# Patient Record
Sex: FEMALE | Race: OTHER | NOT HISPANIC OR LATINO
[De-identification: names, ages, dates, MRNs, and addresses within clinical notes are randomized per-mention and may not be internally consistent; named-entity substitution may affect disease eponyms.]

---

## 2018-09-13 PROBLEM — Z00.00 ENCOUNTER FOR PREVENTIVE HEALTH EXAMINATION: Status: ACTIVE | Noted: 2018-09-13

## 2018-09-18 ENCOUNTER — APPOINTMENT (OUTPATIENT)
Dept: OBGYN | Facility: CLINIC | Age: 33
End: 2018-09-18
Payer: OTHER GOVERNMENT

## 2018-09-18 ENCOUNTER — ASOB RESULT (OUTPATIENT)
Age: 33
End: 2018-09-18

## 2018-09-18 VITALS
SYSTOLIC BLOOD PRESSURE: 133 MMHG | HEART RATE: 79 BPM | BODY MASS INDEX: 23.06 KG/M2 | HEIGHT: 64 IN | DIASTOLIC BLOOD PRESSURE: 82 MMHG | WEIGHT: 135.1 LBS

## 2018-09-18 DIAGNOSIS — Z32.01 ENCOUNTER FOR PREGNANCY TEST, RESULT POSITIVE: ICD-10-CM

## 2018-09-18 PROCEDURE — 76805 OB US >/= 14 WKS SNGL FETUS: CPT

## 2018-09-18 PROCEDURE — 99204 OFFICE O/P NEW MOD 45 MIN: CPT

## 2018-09-20 ENCOUNTER — APPOINTMENT (OUTPATIENT)
Dept: ANTEPARTUM | Facility: CLINIC | Age: 33
End: 2018-09-20
Payer: OTHER GOVERNMENT

## 2018-09-20 ENCOUNTER — ASOB RESULT (OUTPATIENT)
Age: 33
End: 2018-09-20

## 2018-09-20 PROCEDURE — 76813 OB US NUCHAL MEAS 1 GEST: CPT

## 2018-09-20 PROCEDURE — 76801 OB US < 14 WKS SINGLE FETUS: CPT

## 2018-09-20 PROCEDURE — 36416 COLLJ CAPILLARY BLOOD SPEC: CPT

## 2018-10-18 ENCOUNTER — APPOINTMENT (OUTPATIENT)
Dept: OBGYN | Facility: CLINIC | Age: 33
End: 2018-10-18
Payer: OTHER GOVERNMENT

## 2018-10-18 ENCOUNTER — NON-APPOINTMENT (OUTPATIENT)
Age: 33
End: 2018-10-18

## 2018-10-18 ENCOUNTER — LABORATORY RESULT (OUTPATIENT)
Age: 33
End: 2018-10-18

## 2018-10-18 VITALS
WEIGHT: 137 LBS | HEIGHT: 64 IN | BODY MASS INDEX: 23.39 KG/M2 | DIASTOLIC BLOOD PRESSURE: 84 MMHG | SYSTOLIC BLOOD PRESSURE: 124 MMHG

## 2018-10-18 DIAGNOSIS — Z34.02 ENCOUNTER FOR SUPERVISION OF NORMAL FIRST PREGNANCY, SECOND TRIMESTER: ICD-10-CM

## 2018-10-18 PROCEDURE — 0501F PRENATAL FLOW SHEET: CPT

## 2018-11-08 ENCOUNTER — APPOINTMENT (OUTPATIENT)
Dept: ANTEPARTUM | Facility: CLINIC | Age: 33
End: 2018-11-08
Payer: OTHER GOVERNMENT

## 2018-11-08 ENCOUNTER — ASOB RESULT (OUTPATIENT)
Age: 33
End: 2018-11-08

## 2018-11-08 PROCEDURE — 76811 OB US DETAILED SNGL FETUS: CPT

## 2018-11-15 ENCOUNTER — APPOINTMENT (OUTPATIENT)
Dept: OBGYN | Facility: CLINIC | Age: 33
End: 2018-11-15
Payer: OTHER GOVERNMENT

## 2018-11-15 VITALS
HEIGHT: 64 IN | SYSTOLIC BLOOD PRESSURE: 121 MMHG | WEIGHT: 145.06 LBS | DIASTOLIC BLOOD PRESSURE: 76 MMHG | BODY MASS INDEX: 24.77 KG/M2

## 2018-11-15 PROCEDURE — 90471 IMMUNIZATION ADMIN: CPT

## 2018-11-15 PROCEDURE — 90688 IIV4 VACCINE SPLT 0.5 ML IM: CPT

## 2018-11-15 PROCEDURE — 0502F SUBSEQUENT PRENATAL CARE: CPT

## 2018-11-29 ENCOUNTER — OTHER (OUTPATIENT)
Age: 33
End: 2018-11-29

## 2018-12-13 ENCOUNTER — NON-APPOINTMENT (OUTPATIENT)
Age: 33
End: 2018-12-13

## 2018-12-13 ENCOUNTER — APPOINTMENT (OUTPATIENT)
Dept: OBGYN | Facility: CLINIC | Age: 33
End: 2018-12-13
Payer: OTHER GOVERNMENT

## 2018-12-13 VITALS
WEIGHT: 146 LBS | HEIGHT: 64 IN | BODY MASS INDEX: 24.92 KG/M2 | SYSTOLIC BLOOD PRESSURE: 107 MMHG | DIASTOLIC BLOOD PRESSURE: 69 MMHG

## 2018-12-13 PROCEDURE — 0502F SUBSEQUENT PRENATAL CARE: CPT

## 2019-01-03 ENCOUNTER — APPOINTMENT (OUTPATIENT)
Dept: OBGYN | Facility: CLINIC | Age: 34
End: 2019-01-03
Payer: OTHER GOVERNMENT

## 2019-01-03 VITALS
SYSTOLIC BLOOD PRESSURE: 116 MMHG | HEIGHT: 64 IN | BODY MASS INDEX: 26.14 KG/M2 | DIASTOLIC BLOOD PRESSURE: 74 MMHG | WEIGHT: 153.13 LBS

## 2019-01-03 PROCEDURE — 0502F SUBSEQUENT PRENATAL CARE: CPT

## 2019-01-03 PROCEDURE — 90471 IMMUNIZATION ADMIN: CPT

## 2019-01-03 PROCEDURE — 90715 TDAP VACCINE 7 YRS/> IM: CPT

## 2019-01-04 LAB
ABO + RH PNL BLD: NORMAL
AR GENE MUT ANL BLD/T: NEGATIVE
BACTERIA UR CULT: NORMAL
BASOPHILS # BLD AUTO: 0.03 K/UL
BASOPHILS NFR BLD AUTO: 0.2 %
BLD GP AB SCN SERPL QL: NORMAL
C TRACH RRNA SPEC QL NAA+PROBE: NOT DETECTED
CFTR MUT TESTED BLD/T: NEGATIVE
EOSINOPHIL # BLD AUTO: 0.16 K/UL
EOSINOPHIL NFR BLD AUTO: 1.2 %
FMR1 GENE MUT ANL BLD/T: NORMAL
GLUCOSE 1H P 50 G GLC PO SERPL-MCNC: 131 MG/DL
HBV SURFACE AG SER QL: NONREACTIVE
HCT VFR BLD CALC: 35.4 %
HGB A MFR BLD: 97.3 %
HGB A2 MFR BLD: 2.7 %
HGB BLD-MCNC: 11.4 G/DL
HGB FRACT BLD-IMP: NORMAL
HIV1+2 AB SPEC QL IA.RAPID: NONREACTIVE
IMM GRANULOCYTES NFR BLD AUTO: 1.7 %
LEAD BLD-MCNC: <1 UG/DL
LYMPHOCYTES # BLD AUTO: 2.25 K/UL
LYMPHOCYTES NFR BLD AUTO: 16.4 %
MAN DIFF?: NORMAL
MCHC RBC-ENTMCNC: 29.9 PG
MCHC RBC-ENTMCNC: 32.2 GM/DL
MCV RBC AUTO: 92.9 FL
MONOCYTES # BLD AUTO: 0.87 K/UL
MONOCYTES NFR BLD AUTO: 6.3 %
N GONORRHOEA RRNA SPEC QL NAA+PROBE: NOT DETECTED
NEUTROPHILS # BLD AUTO: 10.22 K/UL
NEUTROPHILS NFR BLD AUTO: 74.2 %
PLATELET # BLD AUTO: 217 K/UL
RBC # BLD: 3.81 M/UL
RBC # FLD: 13.9 %
RUBV IGG FLD-ACNC: 5.4 INDEX
RUBV IGG SER-IMP: POSITIVE
SOURCE AMPLIFICATION: NORMAL
T PALLIDUM AB SER QL IA: NEGATIVE
T4 FREE SERPL-MCNC: 1.2 NG/DL
TSH SERPL-ACNC: 1.4 UIU/ML
VZV AB TITR SER: POSITIVE
VZV IGG SER IF-ACNC: 803.4 INDEX
WBC # FLD AUTO: 13.76 K/UL

## 2019-01-17 ENCOUNTER — ASOB RESULT (OUTPATIENT)
Age: 34
End: 2019-01-17

## 2019-01-17 ENCOUNTER — APPOINTMENT (OUTPATIENT)
Dept: ANTEPARTUM | Facility: CLINIC | Age: 34
End: 2019-01-17
Payer: OTHER GOVERNMENT

## 2019-01-17 PROCEDURE — 76805 OB US >/= 14 WKS SNGL FETUS: CPT

## 2019-01-17 PROCEDURE — 76819 FETAL BIOPHYS PROFIL W/O NST: CPT

## 2019-01-23 ENCOUNTER — NON-APPOINTMENT (OUTPATIENT)
Age: 34
End: 2019-01-23

## 2019-01-23 ENCOUNTER — APPOINTMENT (OUTPATIENT)
Dept: OBGYN | Facility: CLINIC | Age: 34
End: 2019-01-23
Payer: OTHER GOVERNMENT

## 2019-01-23 VITALS
DIASTOLIC BLOOD PRESSURE: 77 MMHG | SYSTOLIC BLOOD PRESSURE: 117 MMHG | HEIGHT: 64 IN | BODY MASS INDEX: 26.32 KG/M2 | WEIGHT: 154.19 LBS

## 2019-01-23 PROCEDURE — 0502F SUBSEQUENT PRENATAL CARE: CPT

## 2019-01-31 ENCOUNTER — APPOINTMENT (OUTPATIENT)
Dept: ANTEPARTUM | Facility: HOSPITAL | Age: 34
End: 2019-01-31

## 2019-01-31 ENCOUNTER — ASOB RESULT (OUTPATIENT)
Age: 34
End: 2019-01-31

## 2019-01-31 ENCOUNTER — APPOINTMENT (OUTPATIENT)
Dept: ANTEPARTUM | Facility: CLINIC | Age: 34
End: 2019-01-31
Payer: OTHER GOVERNMENT

## 2019-01-31 PROCEDURE — 76818 FETAL BIOPHYS PROFILE W/NST: CPT | Mod: 26

## 2019-01-31 PROCEDURE — 76820 UMBILICAL ARTERY ECHO: CPT

## 2019-01-31 PROCEDURE — 76816 OB US FOLLOW-UP PER FETUS: CPT

## 2019-02-06 ENCOUNTER — APPOINTMENT (OUTPATIENT)
Dept: ANTEPARTUM | Facility: CLINIC | Age: 34
End: 2019-02-06
Payer: OTHER GOVERNMENT

## 2019-02-06 ENCOUNTER — OUTPATIENT (OUTPATIENT)
Dept: OUTPATIENT SERVICES | Facility: HOSPITAL | Age: 34
LOS: 1 days | End: 2019-02-06

## 2019-02-06 ENCOUNTER — APPOINTMENT (OUTPATIENT)
Dept: ANTEPARTUM | Facility: HOSPITAL | Age: 34
End: 2019-02-06

## 2019-02-06 ENCOUNTER — ASOB RESULT (OUTPATIENT)
Age: 34
End: 2019-02-06

## 2019-02-06 PROCEDURE — 76820 UMBILICAL ARTERY ECHO: CPT

## 2019-02-06 PROCEDURE — 76818 FETAL BIOPHYS PROFILE W/NST: CPT | Mod: 26

## 2019-02-07 ENCOUNTER — NON-APPOINTMENT (OUTPATIENT)
Age: 34
End: 2019-02-07

## 2019-02-07 ENCOUNTER — APPOINTMENT (OUTPATIENT)
Dept: OBGYN | Facility: CLINIC | Age: 34
End: 2019-02-07
Payer: OTHER GOVERNMENT

## 2019-02-07 VITALS
HEIGHT: 64 IN | WEIGHT: 160 LBS | BODY MASS INDEX: 27.31 KG/M2 | DIASTOLIC BLOOD PRESSURE: 82 MMHG | SYSTOLIC BLOOD PRESSURE: 118 MMHG

## 2019-02-07 PROCEDURE — 0502F SUBSEQUENT PRENATAL CARE: CPT

## 2019-02-07 RX ORDER — PRENATAL VIT 49/IRON FUM/FOLIC 6.75-0.2MG
TABLET ORAL
Refills: 0 | Status: ACTIVE | COMMUNITY

## 2019-02-14 ENCOUNTER — APPOINTMENT (OUTPATIENT)
Dept: ANTEPARTUM | Facility: HOSPITAL | Age: 34
End: 2019-02-14

## 2019-02-14 ENCOUNTER — ASOB RESULT (OUTPATIENT)
Age: 34
End: 2019-02-14

## 2019-02-14 ENCOUNTER — OUTPATIENT (OUTPATIENT)
Dept: OUTPATIENT SERVICES | Facility: HOSPITAL | Age: 34
LOS: 1 days | End: 2019-02-14

## 2019-02-14 ENCOUNTER — APPOINTMENT (OUTPATIENT)
Dept: ANTEPARTUM | Facility: CLINIC | Age: 34
End: 2019-02-14
Payer: OTHER GOVERNMENT

## 2019-02-14 PROCEDURE — 76820 UMBILICAL ARTERY ECHO: CPT

## 2019-02-14 PROCEDURE — 76818 FETAL BIOPHYS PROFILE W/NST: CPT | Mod: 26

## 2019-02-14 PROCEDURE — 76816 OB US FOLLOW-UP PER FETUS: CPT

## 2019-02-20 ENCOUNTER — ASOB RESULT (OUTPATIENT)
Age: 34
End: 2019-02-20

## 2019-02-20 ENCOUNTER — NON-APPOINTMENT (OUTPATIENT)
Age: 34
End: 2019-02-20

## 2019-02-20 ENCOUNTER — APPOINTMENT (OUTPATIENT)
Dept: OBGYN | Facility: CLINIC | Age: 34
End: 2019-02-20
Payer: OTHER GOVERNMENT

## 2019-02-20 ENCOUNTER — OUTPATIENT (OUTPATIENT)
Dept: OUTPATIENT SERVICES | Facility: HOSPITAL | Age: 34
LOS: 1 days | End: 2019-02-20

## 2019-02-20 ENCOUNTER — APPOINTMENT (OUTPATIENT)
Dept: ANTEPARTUM | Facility: HOSPITAL | Age: 34
End: 2019-02-20

## 2019-02-20 ENCOUNTER — APPOINTMENT (OUTPATIENT)
Dept: ANTEPARTUM | Facility: CLINIC | Age: 34
End: 2019-02-20
Payer: OTHER GOVERNMENT

## 2019-02-20 VITALS
SYSTOLIC BLOOD PRESSURE: 132 MMHG | BODY MASS INDEX: 28.17 KG/M2 | HEIGHT: 64 IN | DIASTOLIC BLOOD PRESSURE: 87 MMHG | WEIGHT: 165 LBS

## 2019-02-20 DIAGNOSIS — O12.10 GESTATIONAL PROTEINURIA, UNSPECIFIED TRIMESTER: ICD-10-CM

## 2019-02-20 PROCEDURE — 76818 FETAL BIOPHYS PROFILE W/NST: CPT | Mod: 26

## 2019-02-20 PROCEDURE — 0502F SUBSEQUENT PRENATAL CARE: CPT

## 2019-02-20 PROCEDURE — 76820 UMBILICAL ARTERY ECHO: CPT

## 2019-02-21 ENCOUNTER — APPOINTMENT (OUTPATIENT)
Dept: ANTEPARTUM | Facility: HOSPITAL | Age: 34
End: 2019-02-21

## 2019-02-25 ENCOUNTER — OTHER (OUTPATIENT)
Age: 34
End: 2019-02-25

## 2019-02-25 LAB
ALT SERPL-CCNC: 18 U/L
AST SERPL-CCNC: 27 U/L
BASOPHILS # BLD AUTO: 0.05 K/UL
BASOPHILS NFR BLD AUTO: 0.4 %
BUN SERPL-MCNC: 10 MG/DL
CREAT SERPL-MCNC: 0.46 MG/DL
EOSINOPHIL # BLD AUTO: 0.12 K/UL
EOSINOPHIL NFR BLD AUTO: 0.9 %
HCT VFR BLD CALC: 40.1 %
HGB BLD-MCNC: 12.6 G/DL
IMM GRANULOCYTES NFR BLD AUTO: 1.9 %
LDH SERPL-CCNC: 201 U/L
LYMPHOCYTES # BLD AUTO: 1.89 K/UL
LYMPHOCYTES NFR BLD AUTO: 14.1 %
MAN DIFF?: NORMAL
MCHC RBC-ENTMCNC: 30.2 PG
MCHC RBC-ENTMCNC: 31.4 GM/DL
MCV RBC AUTO: 96.2 FL
MONOCYTES # BLD AUTO: 1.04 K/UL
MONOCYTES NFR BLD AUTO: 7.8 %
NEUTROPHILS # BLD AUTO: 10.01 K/UL
NEUTROPHILS NFR BLD AUTO: 74.9 %
PLATELET # BLD AUTO: 212 K/UL
RBC # BLD: 4.17 M/UL
RBC # FLD: 14.2 %
URATE SERPL-MCNC: 4.1 MG/DL
WBC # FLD AUTO: 13.37 K/UL

## 2019-02-27 DIAGNOSIS — O36.5930 MATERNAL CARE FOR OTHER KNOWN OR SUSPECTED POOR FETAL GROWTH, THIRD TRIMESTER, NOT APPLICABLE OR UNSPECIFIED: ICD-10-CM

## 2019-02-27 DIAGNOSIS — Z3A.34 34 WEEKS GESTATION OF PREGNANCY: ICD-10-CM

## 2019-02-27 DIAGNOSIS — Z3A.33 33 WEEKS GESTATION OF PREGNANCY: ICD-10-CM

## 2019-02-27 DIAGNOSIS — Z3A.35 35 WEEKS GESTATION OF PREGNANCY: ICD-10-CM

## 2019-02-28 ENCOUNTER — ASOB RESULT (OUTPATIENT)
Age: 34
End: 2019-02-28

## 2019-02-28 ENCOUNTER — APPOINTMENT (OUTPATIENT)
Dept: ANTEPARTUM | Facility: CLINIC | Age: 34
End: 2019-02-28
Payer: OTHER GOVERNMENT

## 2019-02-28 ENCOUNTER — APPOINTMENT (OUTPATIENT)
Dept: OBGYN | Facility: CLINIC | Age: 34
End: 2019-02-28
Payer: OTHER GOVERNMENT

## 2019-02-28 ENCOUNTER — APPOINTMENT (OUTPATIENT)
Dept: ANTEPARTUM | Facility: HOSPITAL | Age: 34
End: 2019-02-28

## 2019-02-28 ENCOUNTER — NON-APPOINTMENT (OUTPATIENT)
Age: 34
End: 2019-02-28

## 2019-02-28 VITALS
HEIGHT: 64 IN | DIASTOLIC BLOOD PRESSURE: 69 MMHG | WEIGHT: 169 LBS | BODY MASS INDEX: 28.85 KG/M2 | SYSTOLIC BLOOD PRESSURE: 113 MMHG

## 2019-02-28 PROCEDURE — 76819 FETAL BIOPHYS PROFIL W/O NST: CPT

## 2019-02-28 PROCEDURE — 76816 OB US FOLLOW-UP PER FETUS: CPT

## 2019-02-28 PROCEDURE — 0502F SUBSEQUENT PRENATAL CARE: CPT

## 2019-03-03 LAB
GP B STREP DNA SPEC QL NAA+PROBE: DETECTED
GP B STREP DNA SPEC QL NAA+PROBE: NORMAL
SOURCE GBS: NORMAL

## 2019-03-07 ENCOUNTER — APPOINTMENT (OUTPATIENT)
Dept: ANTEPARTUM | Facility: CLINIC | Age: 34
End: 2019-03-07

## 2019-03-07 ENCOUNTER — APPOINTMENT (OUTPATIENT)
Dept: ANTEPARTUM | Facility: HOSPITAL | Age: 34
End: 2019-03-07

## 2019-03-07 ENCOUNTER — APPOINTMENT (OUTPATIENT)
Dept: OBGYN | Facility: CLINIC | Age: 34
End: 2019-03-07

## 2019-03-07 VITALS
BODY MASS INDEX: 28.54 KG/M2 | WEIGHT: 167.2 LBS | SYSTOLIC BLOOD PRESSURE: 135 MMHG | HEIGHT: 64 IN | DIASTOLIC BLOOD PRESSURE: 80 MMHG

## 2019-03-14 ENCOUNTER — APPOINTMENT (OUTPATIENT)
Dept: ANTEPARTUM | Facility: CLINIC | Age: 34
End: 2019-03-14

## 2019-03-14 ENCOUNTER — APPOINTMENT (OUTPATIENT)
Dept: OBGYN | Facility: CLINIC | Age: 34
End: 2019-03-14
Payer: OTHER GOVERNMENT

## 2019-03-14 ENCOUNTER — NON-APPOINTMENT (OUTPATIENT)
Age: 34
End: 2019-03-14

## 2019-03-14 ENCOUNTER — APPOINTMENT (OUTPATIENT)
Dept: ANTEPARTUM | Facility: HOSPITAL | Age: 34
End: 2019-03-14

## 2019-03-14 VITALS
WEIGHT: 167 LBS | DIASTOLIC BLOOD PRESSURE: 82 MMHG | HEIGHT: 64 IN | SYSTOLIC BLOOD PRESSURE: 133 MMHG | BODY MASS INDEX: 28.51 KG/M2

## 2019-03-14 PROCEDURE — 0502F SUBSEQUENT PRENATAL CARE: CPT

## 2019-03-15 LAB — HIV1+2 AB SPEC QL IA.RAPID: NONREACTIVE

## 2019-03-21 ENCOUNTER — APPOINTMENT (OUTPATIENT)
Dept: ANTEPARTUM | Facility: CLINIC | Age: 34
End: 2019-03-21
Payer: OTHER GOVERNMENT

## 2019-03-21 ENCOUNTER — ASOB RESULT (OUTPATIENT)
Age: 34
End: 2019-03-21

## 2019-03-21 ENCOUNTER — APPOINTMENT (OUTPATIENT)
Dept: OBGYN | Facility: CLINIC | Age: 34
End: 2019-03-21
Payer: OTHER GOVERNMENT

## 2019-03-21 ENCOUNTER — APPOINTMENT (OUTPATIENT)
Dept: ANTEPARTUM | Facility: HOSPITAL | Age: 34
End: 2019-03-21

## 2019-03-21 VITALS
WEIGHT: 171.06 LBS | HEIGHT: 64 IN | SYSTOLIC BLOOD PRESSURE: 134 MMHG | DIASTOLIC BLOOD PRESSURE: 86 MMHG | BODY MASS INDEX: 29.2 KG/M2

## 2019-03-21 PROCEDURE — 0502F SUBSEQUENT PRENATAL CARE: CPT

## 2019-03-21 PROCEDURE — 76816 OB US FOLLOW-UP PER FETUS: CPT

## 2019-03-21 PROCEDURE — 76819 FETAL BIOPHYS PROFIL W/O NST: CPT

## 2019-03-27 ENCOUNTER — APPOINTMENT (OUTPATIENT)
Dept: OBGYN | Facility: CLINIC | Age: 34
End: 2019-03-27
Payer: OTHER GOVERNMENT

## 2019-03-27 VITALS
HEIGHT: 64 IN | DIASTOLIC BLOOD PRESSURE: 86 MMHG | SYSTOLIC BLOOD PRESSURE: 122 MMHG | BODY MASS INDEX: 29.29 KG/M2 | WEIGHT: 171.56 LBS

## 2019-03-27 DIAGNOSIS — Z34.03 ENCOUNTER FOR SUPERVISION OF NORMAL FIRST PREGNANCY, THIRD TRIMESTER: ICD-10-CM

## 2019-03-27 PROCEDURE — 0502F SUBSEQUENT PRENATAL CARE: CPT

## 2019-03-28 ENCOUNTER — INPATIENT (INPATIENT)
Facility: HOSPITAL | Age: 34
LOS: 2 days | Discharge: HOME | End: 2019-03-31
Attending: OBSTETRICS & GYNECOLOGY | Admitting: OBSTETRICS & GYNECOLOGY
Payer: OTHER GOVERNMENT

## 2019-03-28 VITALS
SYSTOLIC BLOOD PRESSURE: 138 MMHG | RESPIRATION RATE: 16 BRPM | HEART RATE: 116 BPM | DIASTOLIC BLOOD PRESSURE: 83 MMHG | TEMPERATURE: 99 F

## 2019-03-28 DIAGNOSIS — Z3A.00 WEEKS OF GESTATION OF PREGNANCY NOT SPECIFIED: ICD-10-CM

## 2019-03-28 DIAGNOSIS — Z90.49 ACQUIRED ABSENCE OF OTHER SPECIFIED PARTS OF DIGESTIVE TRACT: Chronic | ICD-10-CM

## 2019-03-28 DIAGNOSIS — O26.899 OTHER SPECIFIED PREGNANCY RELATED CONDITIONS, UNSPECIFIED TRIMESTER: ICD-10-CM

## 2019-03-28 LAB
BASOPHILS # BLD AUTO: 0.05 K/UL — SIGNIFICANT CHANGE UP (ref 0–0.2)
BASOPHILS NFR BLD AUTO: 0.4 % — SIGNIFICANT CHANGE UP (ref 0–2)
EOSINOPHIL # BLD AUTO: 0.08 K/UL — SIGNIFICANT CHANGE UP (ref 0–0.5)
EOSINOPHIL NFR BLD AUTO: 0.6 % — SIGNIFICANT CHANGE UP (ref 0–6)
HCT VFR BLD CALC: 37.8 % — SIGNIFICANT CHANGE UP (ref 34.5–45)
HGB BLD-MCNC: 12.9 G/DL — SIGNIFICANT CHANGE UP (ref 11.5–15.5)
IMM GRANULOCYTES NFR BLD AUTO: 0.8 % — SIGNIFICANT CHANGE UP (ref 0–1.5)
LYMPHOCYTES # BLD AUTO: 1.85 K/UL — SIGNIFICANT CHANGE UP (ref 1–3.3)
LYMPHOCYTES # BLD AUTO: 13.5 % — SIGNIFICANT CHANGE UP (ref 13–44)
MCHC RBC-ENTMCNC: 29.9 PG — SIGNIFICANT CHANGE UP (ref 27–34)
MCHC RBC-ENTMCNC: 34.1 % — SIGNIFICANT CHANGE UP (ref 32–36)
MCV RBC AUTO: 87.7 FL — SIGNIFICANT CHANGE UP (ref 80–100)
MONOCYTES # BLD AUTO: 0.68 K/UL — SIGNIFICANT CHANGE UP (ref 0–0.9)
MONOCYTES NFR BLD AUTO: 5 % — SIGNIFICANT CHANGE UP (ref 2–14)
NEUTROPHILS # BLD AUTO: 10.93 K/UL — HIGH (ref 1.8–7.4)
NEUTROPHILS NFR BLD AUTO: 79.7 % — HIGH (ref 43–77)
NRBC # FLD: 0 K/UL — SIGNIFICANT CHANGE UP (ref 0–0)
PLATELET # BLD AUTO: 207 K/UL — SIGNIFICANT CHANGE UP (ref 150–400)
PMV BLD: 10.6 FL — SIGNIFICANT CHANGE UP (ref 7–13)
RBC # BLD: 4.31 M/UL — SIGNIFICANT CHANGE UP (ref 3.8–5.2)
RBC # FLD: 13.6 % — SIGNIFICANT CHANGE UP (ref 10.3–14.5)
RH IG SCN BLD-IMP: POSITIVE — SIGNIFICANT CHANGE UP
WBC # BLD: 13.7 K/UL — HIGH (ref 3.8–10.5)
WBC # FLD AUTO: 13.7 K/UL — HIGH (ref 3.8–10.5)

## 2019-03-28 RX ORDER — SODIUM CHLORIDE 9 MG/ML
1000 INJECTION, SOLUTION INTRAVENOUS ONCE
Qty: 0 | Refills: 0 | Status: DISCONTINUED | OUTPATIENT
Start: 2019-03-28 | End: 2019-03-28

## 2019-03-28 RX ORDER — SODIUM CHLORIDE 9 MG/ML
1000 INJECTION, SOLUTION INTRAVENOUS
Qty: 0 | Refills: 0 | Status: DISCONTINUED | OUTPATIENT
Start: 2019-03-28 | End: 2019-03-28

## 2019-03-28 RX ORDER — AMPICILLIN TRIHYDRATE 250 MG
CAPSULE ORAL
Qty: 0 | Refills: 0 | Status: DISCONTINUED | OUTPATIENT
Start: 2019-03-28 | End: 2019-03-29

## 2019-03-28 RX ORDER — AMPICILLIN TRIHYDRATE 250 MG
1 CAPSULE ORAL EVERY 4 HOURS
Qty: 0 | Refills: 0 | Status: DISCONTINUED | OUTPATIENT
Start: 2019-03-28 | End: 2019-03-29

## 2019-03-28 RX ORDER — OXYTOCIN 10 UNIT/ML
333.33 VIAL (ML) INJECTION
Qty: 20 | Refills: 0 | Status: DISCONTINUED | OUTPATIENT
Start: 2019-03-28 | End: 2019-03-28

## 2019-03-28 RX ORDER — AMPICILLIN TRIHYDRATE 250 MG
1 CAPSULE ORAL EVERY 4 HOURS
Qty: 0 | Refills: 0 | Status: DISCONTINUED | OUTPATIENT
Start: 2019-03-28 | End: 2019-03-28

## 2019-03-28 RX ORDER — AMPICILLIN TRIHYDRATE 250 MG
2 CAPSULE ORAL ONCE
Qty: 0 | Refills: 0 | Status: COMPLETED | OUTPATIENT
Start: 2019-03-28 | End: 2019-03-28

## 2019-03-28 RX ORDER — AMPICILLIN TRIHYDRATE 250 MG
2 CAPSULE ORAL ONCE
Qty: 0 | Refills: 0 | Status: DISCONTINUED | OUTPATIENT
Start: 2019-03-28 | End: 2019-03-28

## 2019-03-28 RX ORDER — AMPICILLIN TRIHYDRATE 250 MG
CAPSULE ORAL
Qty: 0 | Refills: 0 | Status: DISCONTINUED | OUTPATIENT
Start: 2019-03-28 | End: 2019-03-28

## 2019-03-28 RX ADMIN — Medication 116 GRAM(S): at 17:20

## 2019-03-28 RX ADMIN — Medication 108 GRAM(S): at 21:17

## 2019-03-28 NOTE — OB PROVIDER IHI INDUCTION/AUGMENTATION NOTE - NS_CHECKALL_OBGYN_ALL_OB
FHR was reviewed/Induction / Augmentation was discussed/Order was written/Contractions pattern was reviewed/H&P was completed

## 2019-03-28 NOTE — CHART NOTE - NSCHARTNOTEFT_GEN_A_CORE
Pt w/ c/o 9/10 pain Pt w/ c/o 9/10 pain w/ ctxs and assessed for cervical change. VE: 4/70/-3  Pain management option reviewed with patient including ambulation, different labor positions, IV pain medication, and epidural. Pt wants to ambulate for now. Considering epidural.    EFM: Cat I  [place]: q2min    Vital Signs Last 24 Hrs  T(C): 36.8 (28 Mar 2019 18:25), Max: 37.1 (28 Mar 2019 10:53)  T(F): 98.3 (28 Mar 2019 18:25), Max: 98.8 (28 Mar 2019 10:53)  HR: 94 (28 Mar 2019 18:25) (94 - 116)  BP: 120/69 (28 Mar 2019 18:25) (113/58 - 126/81)  RR: 17 (28 Mar 2019 18:25) (16 - 18)  SpO2: 96% (28 Mar 2019 18:25) (96% - 98%)    P:  -Move to labor floor  -Start pitocin  -Epidural PRN    D/w Dr. Jah Davis, PGY-1

## 2019-03-28 NOTE — OB PROVIDER H&P - ASSESSMENT
33yo  @ 40.2 wks SLIUP here with ruptured membranes since 6:30 am. Pt was being followed by ATU for poor fetal growth but has resolved with an EFW of 3361g as of 3/21. Pt is GBS +. Pt was dw Dr Calderon and pt was admitted for PO cytotec IOL and ampicillin was ordered for GBS.

## 2019-03-28 NOTE — OB PROVIDER H&P - HISTORY OF PRESENT ILLNESS
33yo  @ 40.2 wks SLIUP uncomp PNC here co bloody show since last night that turned watery this morning at 6:30a. Pt reports some cramping and GFM. No ctx's.

## 2019-03-29 ENCOUNTER — TRANSCRIPTION ENCOUNTER (OUTPATIENT)
Age: 34
End: 2019-03-29

## 2019-03-29 PROCEDURE — 59400 OBSTETRICAL CARE: CPT | Mod: U9,UB,GC

## 2019-03-29 RX ORDER — HYDROCORTISONE 1 %
1 OINTMENT (GRAM) TOPICAL EVERY 4 HOURS
Qty: 0 | Refills: 0 | Status: DISCONTINUED | OUTPATIENT
Start: 2019-03-29 | End: 2019-03-31

## 2019-03-29 RX ORDER — MAGNESIUM HYDROXIDE 400 MG/1
30 TABLET, CHEWABLE ORAL
Qty: 0 | Refills: 0 | Status: DISCONTINUED | OUTPATIENT
Start: 2019-03-29 | End: 2019-03-31

## 2019-03-29 RX ORDER — PRAMOXINE HYDROCHLORIDE 150 MG/15G
1 AEROSOL, FOAM RECTAL EVERY 4 HOURS
Qty: 0 | Refills: 0 | Status: DISCONTINUED | OUTPATIENT
Start: 2019-03-29 | End: 2019-03-31

## 2019-03-29 RX ORDER — PRAMOXINE HYDROCHLORIDE 150 MG/15G
1 AEROSOL, FOAM RECTAL EVERY 4 HOURS
Qty: 0 | Refills: 0 | Status: DISCONTINUED | OUTPATIENT
Start: 2019-03-29 | End: 2019-03-29

## 2019-03-29 RX ORDER — DIPHENHYDRAMINE HCL 50 MG
25 CAPSULE ORAL EVERY 6 HOURS
Qty: 0 | Refills: 0 | Status: DISCONTINUED | OUTPATIENT
Start: 2019-03-29 | End: 2019-03-31

## 2019-03-29 RX ORDER — OXYTOCIN 10 UNIT/ML
41.67 VIAL (ML) INJECTION
Qty: 20 | Refills: 0 | Status: DISCONTINUED | OUTPATIENT
Start: 2019-03-29 | End: 2019-03-29

## 2019-03-29 RX ORDER — GLYCERIN ADULT
1 SUPPOSITORY, RECTAL RECTAL AT BEDTIME
Qty: 0 | Refills: 0 | Status: DISCONTINUED | OUTPATIENT
Start: 2019-03-29 | End: 2019-03-31

## 2019-03-29 RX ORDER — OXYTOCIN 10 UNIT/ML
333.33 VIAL (ML) INJECTION
Qty: 20 | Refills: 0 | Status: COMPLETED | OUTPATIENT
Start: 2019-03-29

## 2019-03-29 RX ORDER — ACETAMINOPHEN 500 MG
975 TABLET ORAL EVERY 6 HOURS
Qty: 0 | Refills: 0 | Status: DISCONTINUED | OUTPATIENT
Start: 2019-03-29 | End: 2019-03-31

## 2019-03-29 RX ORDER — AER TRAVELER 0.5 G/1
1 SOLUTION RECTAL; TOPICAL EVERY 4 HOURS
Qty: 0 | Refills: 0 | Status: DISCONTINUED | OUTPATIENT
Start: 2019-03-29 | End: 2019-03-29

## 2019-03-29 RX ORDER — MORPHINE SULFATE 50 MG/1
4 CAPSULE, EXTENDED RELEASE ORAL ONCE
Qty: 0 | Refills: 0 | Status: DISCONTINUED | OUTPATIENT
Start: 2019-03-29 | End: 2019-03-29

## 2019-03-29 RX ORDER — SIMETHICONE 80 MG/1
80 TABLET, CHEWABLE ORAL EVERY 6 HOURS
Qty: 0 | Refills: 0 | Status: DISCONTINUED | OUTPATIENT
Start: 2019-03-29 | End: 2019-03-31

## 2019-03-29 RX ORDER — OXYTOCIN 10 UNIT/ML
41.67 VIAL (ML) INJECTION
Qty: 20 | Refills: 0 | Status: DISCONTINUED | OUTPATIENT
Start: 2019-03-29 | End: 2019-03-30

## 2019-03-29 RX ORDER — IBUPROFEN 200 MG
600 TABLET ORAL EVERY 6 HOURS
Qty: 0 | Refills: 0 | Status: COMPLETED | OUTPATIENT
Start: 2019-03-29 | End: 2020-02-25

## 2019-03-29 RX ORDER — OXYTOCIN 10 UNIT/ML
333.33 VIAL (ML) INJECTION
Qty: 20 | Refills: 0 | Status: COMPLETED | OUTPATIENT
Start: 2019-03-29 | End: 2019-03-29

## 2019-03-29 RX ORDER — DIBUCAINE 1 %
1 OINTMENT (GRAM) RECTAL EVERY 4 HOURS
Qty: 0 | Refills: 0 | Status: DISCONTINUED | OUTPATIENT
Start: 2019-03-29 | End: 2019-03-29

## 2019-03-29 RX ORDER — IBUPROFEN 200 MG
1 TABLET ORAL
Qty: 0 | Refills: 0 | COMMUNITY

## 2019-03-29 RX ORDER — AER TRAVELER 0.5 G/1
1 SOLUTION RECTAL; TOPICAL EVERY 4 HOURS
Qty: 0 | Refills: 0 | Status: DISCONTINUED | OUTPATIENT
Start: 2019-03-29 | End: 2019-03-31

## 2019-03-29 RX ORDER — KETOROLAC TROMETHAMINE 30 MG/ML
30 SYRINGE (ML) INJECTION ONCE
Qty: 0 | Refills: 0 | Status: DISCONTINUED | OUTPATIENT
Start: 2019-03-29 | End: 2019-03-29

## 2019-03-29 RX ORDER — SODIUM CHLORIDE 9 MG/ML
3 INJECTION INTRAMUSCULAR; INTRAVENOUS; SUBCUTANEOUS EVERY 8 HOURS
Qty: 0 | Refills: 0 | Status: DISCONTINUED | OUTPATIENT
Start: 2019-03-29 | End: 2019-03-29

## 2019-03-29 RX ORDER — SODIUM CHLORIDE 9 MG/ML
3 INJECTION INTRAMUSCULAR; INTRAVENOUS; SUBCUTANEOUS EVERY 8 HOURS
Qty: 0 | Refills: 0 | Status: DISCONTINUED | OUTPATIENT
Start: 2019-03-29 | End: 2019-03-31

## 2019-03-29 RX ORDER — OXYCODONE HYDROCHLORIDE 5 MG/1
5 TABLET ORAL
Qty: 0 | Refills: 0 | Status: DISCONTINUED | OUTPATIENT
Start: 2019-03-29 | End: 2019-03-31

## 2019-03-29 RX ORDER — HYDROCORTISONE 1 %
1 OINTMENT (GRAM) TOPICAL EVERY 4 HOURS
Qty: 0 | Refills: 0 | Status: DISCONTINUED | OUTPATIENT
Start: 2019-03-29 | End: 2019-03-29

## 2019-03-29 RX ORDER — OXYCODONE HYDROCHLORIDE 5 MG/1
5 TABLET ORAL EVERY 4 HOURS
Qty: 0 | Refills: 0 | Status: DISCONTINUED | OUTPATIENT
Start: 2019-03-29 | End: 2019-03-31

## 2019-03-29 RX ORDER — DOCUSATE SODIUM 100 MG
100 CAPSULE ORAL
Qty: 0 | Refills: 0 | Status: DISCONTINUED | OUTPATIENT
Start: 2019-03-29 | End: 2019-03-31

## 2019-03-29 RX ORDER — TETANUS TOXOID, REDUCED DIPHTHERIA TOXOID AND ACELLULAR PERTUSSIS VACCINE, ADSORBED 5; 2.5; 8; 8; 2.5 [IU]/.5ML; [IU]/.5ML; UG/.5ML; UG/.5ML; UG/.5ML
0.5 SUSPENSION INTRAMUSCULAR ONCE
Qty: 0 | Refills: 0 | Status: DISCONTINUED | OUTPATIENT
Start: 2019-03-29 | End: 2019-03-31

## 2019-03-29 RX ORDER — OXYTOCIN 10 UNIT/ML
2 VIAL (ML) INJECTION
Qty: 30 | Refills: 0 | Status: DISCONTINUED | OUTPATIENT
Start: 2019-03-29 | End: 2019-03-30

## 2019-03-29 RX ORDER — SODIUM CHLORIDE 9 MG/ML
1000 INJECTION, SOLUTION INTRAVENOUS
Qty: 0 | Refills: 0 | Status: DISCONTINUED | OUTPATIENT
Start: 2019-03-29 | End: 2019-03-29

## 2019-03-29 RX ORDER — SODIUM CHLORIDE 9 MG/ML
1000 INJECTION, SOLUTION INTRAVENOUS ONCE
Qty: 0 | Refills: 0 | Status: DISCONTINUED | OUTPATIENT
Start: 2019-03-29 | End: 2019-03-29

## 2019-03-29 RX ORDER — LANOLIN
1 OINTMENT (GRAM) TOPICAL EVERY 6 HOURS
Qty: 0 | Refills: 0 | Status: DISCONTINUED | OUTPATIENT
Start: 2019-03-29 | End: 2019-03-31

## 2019-03-29 RX ORDER — ACETAMINOPHEN 500 MG
975 TABLET ORAL EVERY 6 HOURS
Qty: 0 | Refills: 0 | Status: COMPLETED | OUTPATIENT
Start: 2019-03-29 | End: 2020-02-25

## 2019-03-29 RX ORDER — IBUPROFEN 200 MG
600 TABLET ORAL EVERY 6 HOURS
Qty: 0 | Refills: 0 | Status: DISCONTINUED | OUTPATIENT
Start: 2019-03-29 | End: 2019-03-31

## 2019-03-29 RX ORDER — DIBUCAINE 1 %
1 OINTMENT (GRAM) RECTAL EVERY 4 HOURS
Qty: 0 | Refills: 0 | Status: DISCONTINUED | OUTPATIENT
Start: 2019-03-29 | End: 2019-03-31

## 2019-03-29 RX ORDER — FAMOTIDINE 10 MG/ML
20 INJECTION INTRAVENOUS ONCE
Qty: 0 | Refills: 0 | Status: COMPLETED | OUTPATIENT
Start: 2019-03-29 | End: 2019-03-29

## 2019-03-29 RX ADMIN — Medication 1000 MILLIUNIT(S)/MIN: at 15:09

## 2019-03-29 RX ADMIN — Medication 600 MILLIGRAM(S): at 22:00

## 2019-03-29 RX ADMIN — Medication 108 GRAM(S): at 01:14

## 2019-03-29 RX ADMIN — Medication 2 MILLIUNIT(S)/MIN: at 05:47

## 2019-03-29 RX ADMIN — MORPHINE SULFATE 4 MILLIGRAM(S): 50 CAPSULE, EXTENDED RELEASE ORAL at 02:49

## 2019-03-29 RX ADMIN — Medication 30 MILLIGRAM(S): at 14:45

## 2019-03-29 RX ADMIN — Medication 108 GRAM(S): at 08:06

## 2019-03-29 RX ADMIN — Medication 600 MILLIGRAM(S): at 21:00

## 2019-03-29 RX ADMIN — FAMOTIDINE 20 MILLIGRAM(S): 10 INJECTION INTRAVENOUS at 11:30

## 2019-03-29 RX ADMIN — MORPHINE SULFATE 4 MILLIGRAM(S): 50 CAPSULE, EXTENDED RELEASE ORAL at 03:00

## 2019-03-29 RX ADMIN — MORPHINE SULFATE 4 MILLIGRAM(S): 50 CAPSULE, EXTENDED RELEASE ORAL at 03:37

## 2019-03-29 RX ADMIN — Medication 975 MILLIGRAM(S): at 21:00

## 2019-03-29 RX ADMIN — Medication 30 MILLIGRAM(S): at 16:06

## 2019-03-29 RX ADMIN — Medication 975 MILLIGRAM(S): at 22:00

## 2019-03-29 RX ADMIN — Medication 125 MILLIUNIT(S)/MIN: at 15:10

## 2019-03-29 RX ADMIN — Medication 108 GRAM(S): at 12:00

## 2019-03-29 RX ADMIN — SODIUM CHLORIDE 3 MILLILITER(S): 9 INJECTION INTRAMUSCULAR; INTRAVENOUS; SUBCUTANEOUS at 22:00

## 2019-03-29 NOTE — OB NEONATOLOGY/PEDIATRICIAN DELIVERY SUMMARY - NSPEDSNEONOTESA_OBGYN_ALL_OB_FT
Baby leander Gerard is a 40.3 wk infant born to a 35 y/o  mother via . Peds called to delivery due to meconium. Maternal history not significant. No prenatal history. Maternal blood type B+. Prenatal labs negative/nonreactive/immune. GBS positive and adequately treated with ampicillin. SROM at 22:00 on 3/27 with clear fluids that turned to light meconium just prior to delivery. Infant born vigorous and crying spontaneously. Skin-to-skin with mother. Infant transitioned to warmer where she was warmed, dried, stimulated and suctioned. APGARS 9/9. EOS: 0.13. Mother would like to breast-feed and consents to the Hepatitis B vaccination prior to discharge.

## 2019-03-29 NOTE — CHART NOTE - NSCHARTNOTEFT_GEN_A_CORE
R1 Note 03-29-19 @ 10:49    Pt was evaluated for discontinuous tracing. Pt was bouncing on exercise ball. ISE was placed so that she could continue.    VITALS:  T(C): 36.7 (03-29-19 @ 10:00), Max: 37.1 (03-28-19 @ 10:53)  HR: 108 (03-29-19 @ 09:59) (94 - 116)  BP: 133/72 (03-29-19 @ 09:59) (113/58 - 133/86)  RR: 17 (03-28-19 @ 18:25) (16 - 18)  SpO2: 96% (03-28-19 @ 18:25) (96% - 98%)    EFM: , mod adrienne, no accel, variable decel  [place]: Ctx Q2-4min  VE: 8.5/90/0  with thick anterior lip      IMPRESSION:   FHR Category: 1  Additional:    PLAN:  Cytotec:  [ ] Continue PO Cyto     [ ] Continue VCyto   [ ] Stop Cytotec  Pitocin: [ ] Start Pitocin   [ ] Increase Pitocin  [ x ] Continue Pitocin  [ ] Decrease Pitocin   [ ] Discontinue Pitocin  [ ]Expectant management [ ]Peanut ball [ x]Labor down  [ x]Re-evaluate  Additional:    Anibal Tan PGY-1

## 2019-03-29 NOTE — DISCHARGE NOTE OB - CARE PLAN
Principal Discharge DX:	Vaginal delivery  Goal:	full recovery  Assessment and plan of treatment:	regular diet, regular activity, follow up 6 weeks

## 2019-03-29 NOTE — CHART NOTE - NSCHARTNOTESELECT_GEN_ALL_CORE
Labor note [Clear Rhinorrhea] : clear rhinorrhea [Capillary Refill <2s] : capillary refill < 2s [NL] : warm

## 2019-03-29 NOTE — CHART NOTE - NSCHARTNOTEFT_GEN_A_CORE
R1 Note 03-29-19 @ 08:30    Pt evaluated for progression of labor    VITALS:  T(C): 36.8 (03-29-19 @ 08:00), Max: 37.1 (03-28-19 @ 10:53)  HR: 108 (03-29-19 @ 07:01) (94 - 116)  BP: 133/77 (03-29-19 @ 07:01) (113/58 - 133/86)  RR: 17 (03-28-19 @ 18:25) (16 - 18)  SpO2: 96% (03-28-19 @ 18:25) (96% - 98%)    EFM: , mod adrienne, +[id number], no decels  [place]: Ctx Q2-4min  VE:7/90/-1      IMPRESSION:   FHR Category: 1  Additional:    PLAN:  Cytotec:  [ ] Continue PO Cyto     [ ] Continue VCyto   [ ] Stop Cytotec  Pitocin: [ ] Start Pitocin   [ x] Increase Pitocin  [x  ] Continue Pitocin  [ ] Decrease Pitocin   [ ] Discontinue Pitocin  [ ]Expectant management [ ]Peanut ball [ x]Labor down  [ x]Re-evaluate  Additional:    Anibal Tan PGY-1

## 2019-03-29 NOTE — DISCHARGE NOTE OB - MEDICATION SUMMARY - MEDICATIONS TO TAKE
I will START or STAY ON the medications listed below when I get home from the hospital:    ibuprofen 600 mg oral tablet  -- 1 tab(s) by mouth every 6 hours  -- Indication: For vaginal delivery I will START or STAY ON the medications listed below when I get home from the hospital:    ibuprofen 600 mg oral tablet  -- 1 tab(s) by mouth every 6 hours  -- Indication: For vaginal delivery    acetaminophen 325 mg oral tablet  -- 3 tab(s) by mouth every 6 hours  -- Indication: For pain, as needed

## 2019-03-29 NOTE — DISCHARGE NOTE OB - HOSPITAL COURSE
Patient presented at full term with rupture of membranes. Induction of labor with Prostaglandin. Uncomplicated vaginal delivery viable female  apgars 9/9. Uncomplicated postpartum course.

## 2019-03-29 NOTE — DISCHARGE NOTE OB - CARE PROVIDER_API CALL
Marcella Correia)  Obstetrics and Gynecology  [address], [address]  [place] [address]  Phone: [phone]  Fax: [phone]  Follow Up Time:

## 2019-03-29 NOTE — DISCHARGE NOTE OB - PATIENT PORTAL LINK FT
You can access the [facility] Patient Portal, offered by [facility], by registering with the following website: [email or web address]

## 2019-03-29 NOTE — CHART NOTE - NSCHARTNOTEFT_GEN_A_CORE
R1 Note 03-29-19 @ 12:25    Pt evaluated for progression of labor    VITALS:  T(C): 36.7 (03-29-19 @ 10:00), Max: 37.0 (03-29-19 @ 00:02)  HR: 108 (03-29-19 @ 09:59) (94 - 111)  BP: 133/72 (03-29-19 @ 09:59) (120/69 - 133/86)  RR: 17 (03-28-19 @ 18:25) (17 - 18)  SpO2: 96% (03-28-19 @ 18:25) (96% - 98%)    EFM: , mod adrienne, +15x15 accels, no decel  [place]: Ctx Q2-5min  VE: 9.5/100/0      IMPRESSION:   FHR Category: 1  Additional:    PLAN:  Cytotec:  [ ] Continue PO Cyto     [ ] Continue VCyto   [ ] Stop Cytotec  Pitocin: [ ] Start Pitocin   [ ] Increase Pitocin  [  x] Continue Pitocin  [ ] Decrease Pitocin   [ ] Discontinue Pitocin  [ ]Expectant management [ x]Peanut ball [x ]Labor down  [x ]Re-evaluate  Additional:    Anibal Tan PGY-1

## 2019-03-29 NOTE — OB PROVIDER DELIVERY SUMMARY - NSPROVIDERDELIVERYNOTE_OBGYN_ALL_OB_FT
Spontaneous vaginal delivery of liveborn infant in KIRSTIN position. Median episiotomy was cut. Head delivered easily. Nuchal cordx1 was noted and delivered through. Shoulders and body delivered easily after. Infant was suctioned. Mec was noted. Infant was passed to mother for delayed cord clamping and skin to skin. After 1 minute, the cord was clamped and cut. Placenta delivered intact with a 3 vessel cord noted. Uterine fundal massage and post partum pitocin was started. Uterine fundus was firm. Vaginal exam was performed and noted intact cervix, vaginal walls and sulci. 3rd degree laceration was noted. Muscle was reapproximated with 0.0 vicryl suture. Good integrity was noted. Laceration was then repaired  with 2.0 chromic suture. Excellent hemostasis was noted. Count was correctx2. Pt. was stable after delivery. Spontaneous vaginal delivery of liveborn infant in KIRSTIN position. Median episiotomy was cut. Head delivered easily. Nuchal cordx1 was noted and delivered through. Shoulders and body delivered easily after. Infant was suctioned. Mec was noted. Infant was passed to mother for delayed cord clamping and skin to skin. After 1 minute, the cord was clamped and cut. Placenta delivered intact with a 3 vessel cord noted. Uterine fundal massage and post partum pitocin was started. Uterine fundus was firm. Vaginal exam was performed and noted intact cervix, vaginal walls and sulci. 3rd degree laceration was noted. Sphincter was reapproximated with 0.0 vicryl suture. Good integrity was noted. Laceration was then repaired  with 2.0 chromic suture. Excellent hemostasis was noted. Count was correctx2. Pt. was stable after delivery.

## 2019-03-30 LAB — T PALLIDUM AB TITR SER: NEGATIVE — SIGNIFICANT CHANGE UP

## 2019-03-30 RX ORDER — ACETAMINOPHEN 500 MG
3 TABLET ORAL
Qty: 0 | Refills: 0 | COMMUNITY
Start: 2019-03-30

## 2019-03-30 RX ADMIN — Medication 600 MILLIGRAM(S): at 15:53

## 2019-03-30 RX ADMIN — Medication 600 MILLIGRAM(S): at 10:23

## 2019-03-30 RX ADMIN — Medication 975 MILLIGRAM(S): at 04:00

## 2019-03-30 RX ADMIN — Medication 600 MILLIGRAM(S): at 03:06

## 2019-03-30 RX ADMIN — Medication 100 MILLIGRAM(S): at 22:09

## 2019-03-30 RX ADMIN — Medication 975 MILLIGRAM(S): at 23:00

## 2019-03-30 RX ADMIN — Medication 600 MILLIGRAM(S): at 23:00

## 2019-03-30 RX ADMIN — Medication 975 MILLIGRAM(S): at 16:30

## 2019-03-30 RX ADMIN — SODIUM CHLORIDE 3 MILLILITER(S): 9 INJECTION INTRAMUSCULAR; INTRAVENOUS; SUBCUTANEOUS at 06:03

## 2019-03-30 RX ADMIN — Medication 975 MILLIGRAM(S): at 09:44

## 2019-03-30 RX ADMIN — Medication 1 TABLET(S): at 09:44

## 2019-03-30 RX ADMIN — Medication 100 MILLIGRAM(S): at 09:45

## 2019-03-30 RX ADMIN — Medication 975 MILLIGRAM(S): at 22:00

## 2019-03-30 RX ADMIN — Medication 600 MILLIGRAM(S): at 09:45

## 2019-03-30 RX ADMIN — Medication 975 MILLIGRAM(S): at 03:06

## 2019-03-30 RX ADMIN — Medication 600 MILLIGRAM(S): at 04:00

## 2019-03-30 RX ADMIN — Medication 975 MILLIGRAM(S): at 15:53

## 2019-03-30 RX ADMIN — Medication 600 MILLIGRAM(S): at 22:00

## 2019-03-30 RX ADMIN — Medication 975 MILLIGRAM(S): at 10:23

## 2019-03-30 RX ADMIN — Medication 600 MILLIGRAM(S): at 16:30

## 2019-03-30 NOTE — PROGRESS NOTE ADULT - PROBLEM SELECTOR PLAN 1
- Pain well controlled, continue current pain regimen  - Increase ambulation  - Continue regular diet    Cheryl Davis, PGY-1

## 2019-03-30 NOTE — PROGRESS NOTE ADULT - SUBJECTIVE AND OBJECTIVE BOX
OB Postpartum Note - Vaginal Delivery  Patient is 34y  s/p  PP day 1    Subjective:  Patient seen and examined at bedside. No acute overnight events. No acute complaints, pain well controlled. Patient is ambulating, voiding spontaneously, passing gas, and tolerating regular diet. Patient denies SOB, CP, N/V, leg pain.     Objective:  Vital Signs Last 24 Hours  T(C): 36.7 (19 @ 01:48), Max: 37 (19 @ 14:45)  HR: 90 (19 @ 01:48) (90 - 113)  BP: 99/54 (19 @ 01:48) (99/54 - 144/81)  RR: 17 (19 @ 01:48) (16 - 20)  SpO2: 99% (19 @ 01:48) (97% - 99%)    Physical Exam:  General: NAD  Pulm: Clear to auscultation bilaterally  Cardio: Normal rate and regular rhythm  Abdomen: Soft, non-tender, non-distended, firm uterine fundus   Pelvic: Lochia wnl  Ext: No edema, No erythema, Non-tender    Labs:  Blood Type: B Positive  Antibody Screen: --  RPR: Negative                            12.9   13.70 )-----------( 207      ( 28 Mar 2019 16:00 )             37.8                 MEDICATIONS  (STANDING):  acetaminophen   Tablet .. 975 milliGRAM(s) Oral every 6 hours  diphtheria/tetanus/pertussis (acellular) Vaccine (ADAcel) 0.5 milliLiter(s) IntraMuscular once  ibuprofen  Tablet. 600 milliGRAM(s) Oral every 6 hours  oxyCODONE    IR 5 milliGRAM(s) Oral every 3 hours  oxytocin Infusion 41.667 milliUNIT(s)/Min (125 mL/Hr) IV Continuous <Continuous>  oxytocin Infusion 2 milliUNIT(s)/Min (2 mL/Hr) IV Continuous <Continuous>  prenatal multivitamin 1 Tablet(s) Oral daily  sodium chloride 0.9% lock flush 3 milliLiter(s) IV Push every 8 hours    MEDICATIONS  (PRN):  dibucaine 1% Ointment 1 Application(s) Topical every 4 hours PRN Perineal Discomfort  diphenhydrAMINE 25 milliGRAM(s) Oral every 6 hours PRN Itching  docusate sodium 100 milliGRAM(s) Oral two times a day PRN Stool Softening  glycerin Suppository - Adult 1 Suppository(s) Rectal at bedtime PRN Constipation  hydrocortisone 1% Cream 1 Application(s) Topical every 4 hours PRN Moderate to Severe Perineal Pain  lanolin Ointment 1 Application(s) Topical every 6 hours PRN Sore Nipples  magnesium hydroxide Suspension 30 milliLiter(s) Oral two times a day PRN Constipation  oxyCODONE    IR 5 milliGRAM(s) Oral every 4 hours PRN Severe Pain (7 -10)  pramoxine 1%/zinc 5% Cream 1 Application(s) Topical every 4 hours PRN Moderate to Severe Perineal Pain  simethicone 80 milliGRAM(s) Chew every 6 hours PRN Gas  witch hazel Pads 1 Application(s) Topical every 4 hours PRN Perineal Discomfort OB Postpartum Note - Vaginal Delivery  Patient is 34y  s/p  PP day 1    Subjective:  Patient seen and examined at bedside. No acute overnight events. No acute complaints, pain well controlled. Patient is ambulating, voiding spontaneously, passing gas, and tolerating regular diet. Patient denies SOB, CP, N/V, leg pain.     Objective:  Vital Signs Last 24 Hours  T(C): 36.7 (19 @ 01:48), Max: 37 (19 @ 14:45)  HR: 90 (19 @ 01:48) (90 - 113)  BP: 99/54 (19 @ 01:48) (99/54 - 144/81)  RR: 17 (19 @ 01:48) (16 - 20)  SpO2: 99% (19 @ 01:48) (97% - 99%)    Physical Exam:  General: NAD  Abdomen: Soft, non-tender, non-distended, firm uterine fundus   Pelvic: Lochia wnl  Ext: No edema, No erythema, Non-tender    Labs:  Blood Type: B Positive  Antibody Screen: --  RPR: Negative                            12.9   13.70 )-----------( 207      ( 28 Mar 2019 16:00 )             37.8                 MEDICATIONS  (STANDING):  acetaminophen   Tablet .. 975 milliGRAM(s) Oral every 6 hours  diphtheria/tetanus/pertussis (acellular) Vaccine (ADAcel) 0.5 milliLiter(s) IntraMuscular once  ibuprofen  Tablet. 600 milliGRAM(s) Oral every 6 hours  oxyCODONE    IR 5 milliGRAM(s) Oral every 3 hours  oxytocin Infusion 41.667 milliUNIT(s)/Min (125 mL/Hr) IV Continuous <Continuous>  oxytocin Infusion 2 milliUNIT(s)/Min (2 mL/Hr) IV Continuous <Continuous>  prenatal multivitamin 1 Tablet(s) Oral daily  sodium chloride 0.9% lock flush 3 milliLiter(s) IV Push every 8 hours    MEDICATIONS  (PRN):  dibucaine 1% Ointment 1 Application(s) Topical every 4 hours PRN Perineal Discomfort  diphenhydrAMINE 25 milliGRAM(s) Oral every 6 hours PRN Itching  docusate sodium 100 milliGRAM(s) Oral two times a day PRN Stool Softening  glycerin Suppository - Adult 1 Suppository(s) Rectal at bedtime PRN Constipation  hydrocortisone 1% Cream 1 Application(s) Topical every 4 hours PRN Moderate to Severe Perineal Pain  lanolin Ointment 1 Application(s) Topical every 6 hours PRN Sore Nipples  magnesium hydroxide Suspension 30 milliLiter(s) Oral two times a day PRN Constipation  oxyCODONE    IR 5 milliGRAM(s) Oral every 4 hours PRN Severe Pain (7 -10)  pramoxine 1%/zinc 5% Cream 1 Application(s) Topical every 4 hours PRN Moderate to Severe Perineal Pain  simethicone 80 milliGRAM(s) Chew every 6 hours PRN Gas  witch hazel Pads 1 Application(s) Topical every 4 hours PRN Perineal Discomfort

## 2019-03-30 NOTE — LACTATION INITIAL EVALUATION - LACTATION INTERVENTIONS
Pt reports unable to ambulate and unable to sit in the chair. Manual pump use to bring out left nipple.  Double electric pumping initiated to help bring out nipples, stimulate milk supply and to supplement.  Infant not interested in feeding at this time.  Recommended frequent attempts and to call for assistance when feeding.  RN aware of plan./initiate skin to skin/initiate hand expression routine/initiate dual electric pump routine

## 2019-03-31 VITALS
OXYGEN SATURATION: 99 % | SYSTOLIC BLOOD PRESSURE: 133 MMHG | TEMPERATURE: 98 F | DIASTOLIC BLOOD PRESSURE: 76 MMHG | HEART RATE: 97 BPM | RESPIRATION RATE: 18 BRPM

## 2019-03-31 RX ADMIN — Medication 975 MILLIGRAM(S): at 04:00

## 2019-03-31 RX ADMIN — Medication 100 MILLIGRAM(S): at 04:12

## 2019-03-31 RX ADMIN — Medication 600 MILLIGRAM(S): at 18:44

## 2019-03-31 RX ADMIN — Medication 975 MILLIGRAM(S): at 18:44

## 2019-03-31 RX ADMIN — Medication 600 MILLIGRAM(S): at 04:00

## 2019-03-31 RX ADMIN — Medication 975 MILLIGRAM(S): at 05:00

## 2019-03-31 RX ADMIN — Medication 1 TABLET(S): at 18:45

## 2019-03-31 RX ADMIN — Medication 600 MILLIGRAM(S): at 05:00

## 2019-03-31 NOTE — PROGRESS NOTE ADULT - PROBLEM SELECTOR PLAN 1
- Pain well controlled, continue current pain regimen  - Increase ambulation  - Continue regular diet  - Discharge planning     Gloria Houston PGY-1
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
## 2019-04-01 ENCOUNTER — APPOINTMENT (OUTPATIENT)
Dept: ANTEPARTUM | Facility: HOSPITAL | Age: 34
End: 2019-04-01

## 2019-04-01 ENCOUNTER — APPOINTMENT (OUTPATIENT)
Dept: ANTEPARTUM | Facility: CLINIC | Age: 34
End: 2019-04-01

## 2019-04-08 ENCOUNTER — OTHER (OUTPATIENT)
Age: 34
End: 2019-04-08

## 2019-05-06 PROBLEM — Z33.2 ENCOUNTER FOR ELECTIVE TERMINATION OF PREGNANCY: Chronic | Status: ACTIVE | Noted: 2019-03-28

## 2019-05-16 ENCOUNTER — APPOINTMENT (OUTPATIENT)
Dept: OBGYN | Facility: CLINIC | Age: 34
End: 2019-05-16
Payer: OTHER GOVERNMENT

## 2019-05-16 VITALS
HEART RATE: 94 BPM | SYSTOLIC BLOOD PRESSURE: 126 MMHG | WEIGHT: 144.31 LBS | BODY MASS INDEX: 24.64 KG/M2 | DIASTOLIC BLOOD PRESSURE: 86 MMHG | HEIGHT: 64 IN

## 2019-05-16 PROCEDURE — 0503F POSTPARTUM CARE VISIT: CPT

## 2019-05-17 LAB
C TRACH RRNA SPEC QL NAA+PROBE: NOT DETECTED
N GONORRHOEA RRNA SPEC QL NAA+PROBE: NOT DETECTED
SOURCE AMPLIFICATION: NORMAL

## 2019-05-22 ENCOUNTER — APPOINTMENT (OUTPATIENT)
Dept: OBGYN | Facility: CLINIC | Age: 34
End: 2019-05-22

## 2019-05-22 LAB — HCG SERPL-MCNC: <1 MIU/ML

## 2019-05-23 ENCOUNTER — APPOINTMENT (OUTPATIENT)
Dept: OBGYN | Facility: CLINIC | Age: 34
End: 2019-05-23
Payer: OTHER GOVERNMENT

## 2019-05-23 ENCOUNTER — ASOB RESULT (OUTPATIENT)
Age: 34
End: 2019-05-23

## 2019-05-23 VITALS
DIASTOLIC BLOOD PRESSURE: 88 MMHG | WEIGHT: 149 LBS | HEART RATE: 93 BPM | SYSTOLIC BLOOD PRESSURE: 135 MMHG | BODY MASS INDEX: 25.44 KG/M2 | HEIGHT: 64 IN

## 2019-05-23 LAB
HCG UR QL: NEGATIVE
QUALITY CONTROL: YES

## 2019-05-23 PROCEDURE — 58300 INSERT INTRAUTERINE DEVICE: CPT

## 2019-05-23 PROCEDURE — 76830 TRANSVAGINAL US NON-OB: CPT

## 2019-05-23 NOTE — PROCEDURE
[IUD Placement] : intrauterine device (IUD) placement [Prevention of Pregnancy] : prevention of pregnancy [Risks] : risks [Benefits] : benefits [Alternatives] : alternatives [Patient] : patient [Infection] : infection [Bleeding] : bleeding [Pain] : pain [Expulsion] : expulsion [Failure] : failure [Uterine Perforation] : uterine perforation [CONSENT OBTAINED] : written consent was obtained prior to the procedure. [Neg Pregnancy Test] : a pregnancy test was negative [Betadine] : Prepped with Betadine [None] : none [Uterus Sounded to ___cm] : sounded to [unfilled]Ucm [Tenaculum] : a single toothed tenaculum [Post Placement Transvag. US] : post placement transvaginal ultrasound completed [ParaGard IUD] : The ParaGard IUD was inserted to the fundus of the uterus.  The IUD strings were cut to an appropriate length. [Lot Number: ___] : IUD lot number: [unfilled] [Tolerated Well] : the patient tolerated the procedure well [No Complications] : there were no complications [Motrin/Ibuprofen] : Motrin/Ibuprofen

## 2019-08-01 ENCOUNTER — TRANSCRIPTION ENCOUNTER (OUTPATIENT)
Age: 34
End: 2019-08-01

## 2019-08-16 NOTE — HISTORY OF PRESENT ILLNESS
[Postpartum Follow Up] : postpartum follow up [] : delivered by vaginal delivery [Female] : Delivery History: baby girl [Wt. ___] : weighing [unfilled] [Delivery Date Was ___] : delivery date was [unfilled] [Vaginal Delivery] : vaginal delivery [Girl] : baby is a girl [Infant's Name ___] : [unfilled] [Living at Home] : is currently living at home [Breastfeeding] : currently nursing [Intended Contraception] : Intended Contraception: [Resumed [place]] : has resumed intercourse [Complications:___] : no complications [BF with Difficulty] : nursing with difficulty [Resumed Menses] : has not resumed her menses [Breast Pain] : breast pain [Back to Normal] : is back to normal in size [None] : no vaginal bleeding [Normal] : the vagina was normal [Healing Well] : is healing well [Cervix Sample Taken] : cervical sample not taken for a Pap smear [The Left Breast Was Examined] : was normal [Tenderness Of The Right Breast] : was tender [de-identified] : 35 y/o P1 sp  overall doing well [de-identified] : 1) Discussed contraception: Will place iud in 2 weeks, advised abstinence/condoms 2) Treatment for early mastitis DC instructions

## 2022-09-27 ENCOUNTER — EMERGENCY (EMERGENCY)
Facility: HOSPITAL | Age: 37
LOS: 0 days | Discharge: HOME | End: 2022-09-28
Attending: EMERGENCY MEDICINE
Payer: COMMERCIAL

## 2022-09-27 VITALS
SYSTOLIC BLOOD PRESSURE: 130 MMHG | WEIGHT: 125 LBS | OXYGEN SATURATION: 97 % | HEART RATE: 79 BPM | TEMPERATURE: 98 F | HEIGHT: 62 IN | RESPIRATION RATE: 18 BRPM | DIASTOLIC BLOOD PRESSURE: 87 MMHG

## 2022-09-27 DIAGNOSIS — S32.501A UNSPECIFIED FRACTURE OF RIGHT PUBIS, INITIAL ENCOUNTER FOR CLOSED FRACTURE: ICD-10-CM
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
## 2022-09-27 DIAGNOSIS — Z90.49 ACQUIRED ABSENCE OF OTHER SPECIFIED PARTS OF DIGESTIVE TRACT: ICD-10-CM

## 2022-09-27 DIAGNOSIS — R51.9 HEADACHE, UNSPECIFIED: ICD-10-CM

## 2022-09-27 DIAGNOSIS — M54.2 CERVICALGIA: ICD-10-CM

## 2022-09-27 DIAGNOSIS — S32.411A DISPLACED FRACTURE OF ANTERIOR WALL OF RIGHT ACETABULUM, INITIAL ENCOUNTER FOR CLOSED FRACTURE: ICD-10-CM

## 2022-09-27 DIAGNOSIS — Z90.49 ACQUIRED ABSENCE OF OTHER SPECIFIED PARTS OF DIGESTIVE TRACT: Chronic | ICD-10-CM

## 2022-09-27 DIAGNOSIS — S32.10XA UNSPECIFIED FRACTURE OF SACRUM, INITIAL ENCOUNTER FOR CLOSED FRACTURE: ICD-10-CM

## 2022-09-27 DIAGNOSIS — Z88.1 ALLERGY STATUS TO OTHER ANTIBIOTIC AGENTS STATUS: ICD-10-CM

## 2022-09-27 DIAGNOSIS — R10.9 UNSPECIFIED ABDOMINAL PAIN: ICD-10-CM

## 2022-09-27 PROCEDURE — 81003 URINALYSIS AUTO W/O SCOPE: CPT

## 2022-09-27 PROCEDURE — 96360 HYDRATION IV INFUSION INIT: CPT

## 2022-09-27 PROCEDURE — 85610 PROTHROMBIN TIME: CPT

## 2022-09-27 PROCEDURE — 83690 ASSAY OF LIPASE: CPT

## 2022-09-27 PROCEDURE — 84702 CHORIONIC GONADOTROPIN TEST: CPT

## 2022-09-27 PROCEDURE — 99285 EMERGENCY DEPT VISIT HI MDM: CPT | Mod: 25

## 2022-09-27 PROCEDURE — 70450 CT HEAD/BRAIN W/O DYE: CPT | Mod: MA

## 2022-09-27 PROCEDURE — 73080 X-RAY EXAM OF ELBOW: CPT | Mod: RT

## 2022-09-27 PROCEDURE — 93005 ELECTROCARDIOGRAM TRACING: CPT

## 2022-09-27 PROCEDURE — 80053 COMPREHEN METABOLIC PANEL: CPT

## 2022-09-27 PROCEDURE — 73090 X-RAY EXAM OF FOREARM: CPT | Mod: RT

## 2022-09-27 PROCEDURE — 99285 EMERGENCY DEPT VISIT HI MDM: CPT

## 2022-09-27 PROCEDURE — 73110 X-RAY EXAM OF WRIST: CPT | Mod: RT

## 2022-09-27 PROCEDURE — 72125 CT NECK SPINE W/O DYE: CPT | Mod: MA

## 2022-09-27 PROCEDURE — 84484 ASSAY OF TROPONIN QUANT: CPT

## 2022-09-27 PROCEDURE — 71260 CT THORAX DX C+: CPT | Mod: MA

## 2022-09-27 PROCEDURE — 85025 COMPLETE CBC W/AUTO DIFF WBC: CPT

## 2022-09-27 PROCEDURE — 74177 CT ABD & PELVIS W/CONTRAST: CPT | Mod: MA

## 2022-09-27 PROCEDURE — 36415 COLL VENOUS BLD VENIPUNCTURE: CPT

## 2022-09-27 PROCEDURE — 73564 X-RAY EXAM KNEE 4 OR MORE: CPT | Mod: 50

## 2022-09-27 PROCEDURE — 85730 THROMBOPLASTIN TIME PARTIAL: CPT

## 2022-09-27 PROCEDURE — 72170 X-RAY EXAM OF PELVIS: CPT
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
## 2022-09-28 VITALS
OXYGEN SATURATION: 97 % | RESPIRATION RATE: 17 BRPM | TEMPERATURE: 98 F | SYSTOLIC BLOOD PRESSURE: 110 MMHG | HEART RATE: 87 BPM | DIASTOLIC BLOOD PRESSURE: 84 MMHG

## 2022-09-28 LAB
ALBUMIN SERPL ELPH-MCNC: 3.1 G/DL — LOW (ref 3.3–5)
ALP SERPL-CCNC: 101 U/L — SIGNIFICANT CHANGE UP (ref 40–120)
ALT FLD-CCNC: 163 U/L — HIGH (ref 12–78)
ANION GAP SERPL CALC-SCNC: 7 MMOL/L — SIGNIFICANT CHANGE UP (ref 5–17)
APPEARANCE UR: CLEAR — SIGNIFICANT CHANGE UP
APTT BLD: 26.5 SEC — LOW (ref 27.5–35.5)
AST SERPL-CCNC: 88 U/L — HIGH (ref 15–37)
BASOPHILS # BLD AUTO: 0.04 K/UL — SIGNIFICANT CHANGE UP (ref 0–0.2)
BASOPHILS NFR BLD AUTO: 0.5 % — SIGNIFICANT CHANGE UP (ref 0–2)
BILIRUB SERPL-MCNC: 0.3 MG/DL — SIGNIFICANT CHANGE UP (ref 0.2–1.2)
BILIRUB UR-MCNC: NEGATIVE — SIGNIFICANT CHANGE UP
BUN SERPL-MCNC: 17 MG/DL — SIGNIFICANT CHANGE UP (ref 7–23)
CALCIUM SERPL-MCNC: 8.9 MG/DL — SIGNIFICANT CHANGE UP (ref 8.5–10.1)
CHLORIDE SERPL-SCNC: 107 MMOL/L — SIGNIFICANT CHANGE UP (ref 96–108)
CO2 SERPL-SCNC: 23 MMOL/L — SIGNIFICANT CHANGE UP (ref 22–31)
COLOR SPEC: YELLOW — SIGNIFICANT CHANGE UP
CREAT SERPL-MCNC: 0.48 MG/DL — LOW (ref 0.5–1.3)
DIFF PNL FLD: NEGATIVE — SIGNIFICANT CHANGE UP
EGFR: 125 ML/MIN/1.73M2 — SIGNIFICANT CHANGE UP
EOSINOPHIL # BLD AUTO: 0.23 K/UL — SIGNIFICANT CHANGE UP (ref 0–0.5)
EOSINOPHIL NFR BLD AUTO: 2.8 % — SIGNIFICANT CHANGE UP (ref 0–6)
GLUCOSE SERPL-MCNC: 104 MG/DL — HIGH (ref 70–99)
GLUCOSE UR QL: NEGATIVE — SIGNIFICANT CHANGE UP
HCG SERPL-ACNC: <1 MIU/ML — SIGNIFICANT CHANGE UP
HCT VFR BLD CALC: 34.2 % — LOW (ref 34.5–45)
HGB BLD-MCNC: 11 G/DL — LOW (ref 11.5–15.5)
IMM GRANULOCYTES NFR BLD AUTO: 0.4 % — SIGNIFICANT CHANGE UP (ref 0–0.9)
INR BLD: 0.9 RATIO — SIGNIFICANT CHANGE UP (ref 0.88–1.16)
KETONES UR-MCNC: NEGATIVE — SIGNIFICANT CHANGE UP
LEUKOCYTE ESTERASE UR-ACNC: NEGATIVE — SIGNIFICANT CHANGE UP
LIDOCAIN IGE QN: 180 U/L — SIGNIFICANT CHANGE UP (ref 73–393)
LYMPHOCYTES # BLD AUTO: 2.79 K/UL — SIGNIFICANT CHANGE UP (ref 1–3.3)
LYMPHOCYTES # BLD AUTO: 33.8 % — SIGNIFICANT CHANGE UP (ref 13–44)
MCHC RBC-ENTMCNC: 28.2 PG — SIGNIFICANT CHANGE UP (ref 27–34)
MCHC RBC-ENTMCNC: 32.2 GM/DL — SIGNIFICANT CHANGE UP (ref 32–36)
MCV RBC AUTO: 87.7 FL — SIGNIFICANT CHANGE UP (ref 80–100)
MONOCYTES # BLD AUTO: 0.72 K/UL — SIGNIFICANT CHANGE UP (ref 0–0.9)
MONOCYTES NFR BLD AUTO: 8.7 % — SIGNIFICANT CHANGE UP (ref 2–14)
NEUTROPHILS # BLD AUTO: 4.44 K/UL — SIGNIFICANT CHANGE UP (ref 1.8–7.4)
NEUTROPHILS NFR BLD AUTO: 53.8 % — SIGNIFICANT CHANGE UP (ref 43–77)
NITRITE UR-MCNC: NEGATIVE — SIGNIFICANT CHANGE UP
PH UR: 6 — SIGNIFICANT CHANGE UP (ref 5–8)
PLATELET # BLD AUTO: 229 K/UL — SIGNIFICANT CHANGE UP (ref 150–400)
POTASSIUM SERPL-MCNC: 3.8 MMOL/L — SIGNIFICANT CHANGE UP (ref 3.5–5.3)
POTASSIUM SERPL-SCNC: 3.8 MMOL/L — SIGNIFICANT CHANGE UP (ref 3.5–5.3)
PROT SERPL-MCNC: 6.8 GM/DL — SIGNIFICANT CHANGE UP (ref 6–8.3)
PROT UR-MCNC: NEGATIVE — SIGNIFICANT CHANGE UP
PROTHROM AB SERPL-ACNC: 10.4 SEC — LOW (ref 10.5–13.4)
RBC # BLD: 3.9 M/UL — SIGNIFICANT CHANGE UP (ref 3.8–5.2)
RBC # FLD: 13.7 % — SIGNIFICANT CHANGE UP (ref 10.3–14.5)
SODIUM SERPL-SCNC: 137 MMOL/L — SIGNIFICANT CHANGE UP (ref 135–145)
SP GR SPEC: 1.01 — SIGNIFICANT CHANGE UP (ref 1.01–1.02)
TROPONIN I, HIGH SENSITIVITY RESULT: 4.67 NG/L — SIGNIFICANT CHANGE UP
UROBILINOGEN FLD QL: NEGATIVE — SIGNIFICANT CHANGE UP
WBC # BLD: 8.25 K/UL — SIGNIFICANT CHANGE UP (ref 3.8–10.5)
WBC # FLD AUTO: 8.25 K/UL — SIGNIFICANT CHANGE UP (ref 3.8–10.5)

## 2022-09-28 PROCEDURE — 73110 X-RAY EXAM OF WRIST: CPT | Mod: 26,RT

## 2022-09-28 PROCEDURE — 99283 EMERGENCY DEPT VISIT LOW MDM: CPT

## 2022-09-28 PROCEDURE — 73080 X-RAY EXAM OF ELBOW: CPT | Mod: 26,RT

## 2022-09-28 PROCEDURE — 71260 CT THORAX DX C+: CPT | Mod: 26,MA

## 2022-09-28 PROCEDURE — 72170 X-RAY EXAM OF PELVIS: CPT | Mod: 26

## 2022-09-28 PROCEDURE — 74177 CT ABD & PELVIS W/CONTRAST: CPT | Mod: 26,MA

## 2022-09-28 PROCEDURE — 70450 CT HEAD/BRAIN W/O DYE: CPT | Mod: 26,MA

## 2022-09-28 PROCEDURE — 93010 ELECTROCARDIOGRAM REPORT: CPT

## 2022-09-28 PROCEDURE — 72125 CT NECK SPINE W/O DYE: CPT | Mod: 26,MA

## 2022-09-28 PROCEDURE — 73090 X-RAY EXAM OF FOREARM: CPT | Mod: 26,RT

## 2022-09-28 PROCEDURE — 73564 X-RAY EXAM KNEE 4 OR MORE: CPT | Mod: 26,50

## 2022-09-28 RX ORDER — SODIUM CHLORIDE 9 MG/ML
500 INJECTION INTRAMUSCULAR; INTRAVENOUS; SUBCUTANEOUS ONCE
Refills: 0 | Status: COMPLETED | OUTPATIENT
Start: 2022-09-28 | End: 2022-09-28

## 2022-09-28 RX ORDER — CEPHALEXIN 500 MG
500 CAPSULE ORAL ONCE
Refills: 0 | Status: COMPLETED | OUTPATIENT
Start: 2022-09-28 | End: 2022-09-28

## 2022-09-28 RX ORDER — CEPHALEXIN 500 MG
1 CAPSULE ORAL
Qty: 8 | Refills: 0
Start: 2022-09-28 | End: 2022-10-01

## 2022-09-28 RX ADMIN — SODIUM CHLORIDE 500 MILLILITER(S): 9 INJECTION INTRAMUSCULAR; INTRAVENOUS; SUBCUTANEOUS at 02:32

## 2022-09-28 RX ADMIN — SODIUM CHLORIDE 500 MILLILITER(S): 9 INJECTION INTRAMUSCULAR; INTRAVENOUS; SUBCUTANEOUS at 03:30

## 2022-09-28 RX ADMIN — Medication 500 MILLIGRAM(S): at 06:19

## 2022-09-28 NOTE — ED PROVIDER NOTE - WET READ LAUNCH FT
There are no Wet Read(s) to document. There are 4 Wet Read(s) to document. There are 5 Wet Read(s) to document.

## 2022-09-28 NOTE — ED PROVIDER NOTE - DATE/TIME 1
28-Sep-2022 02:37 negative Alert & oriented; no sensory, motor or coordination deficits, normal reflexes

## 2022-09-28 NOTE — ED PROVIDER NOTE - ENMT, MLM
Airway patent, Nasal mucosa clear. Mouth with normal mucosa. Throat has no vesicles, no oropharyngeal exudates and uvula is midline. No epistaxis. No septal anomaly.
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
## 2022-09-28 NOTE — ED PROVIDER NOTE - CARE PROVIDER_API CALL
Stanford Baldwin)  Gastroenterology; Internal Medicine  [address] [place] [address]  Phone: [phone]  Fax: [phone]  Follow Up Time:    Stanford Baldwin)  Gastroenterology; Internal Medicine  [address], [place] [address]  Phone: [phone]  Fax: [phone]  Follow Up Time:     Toney Gill)  Orthopaedic Surgery; Surgery of the Hand  [address], [address]  [place] [address]  Phone: [phone]  Fax: [phone]  Follow Up Time:

## 2022-09-28 NOTE — ED ADULT NURSE REASSESSMENT NOTE - NS ED NURSE REASSESS COMMENT FT1
Ambulated with assistance and repositioned for comfort. Awaiting ortho eval. Updated on plan of care, all questions answered.
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
## 2022-09-28 NOTE — ED PROVIDER NOTE - CARE PLAN
1 Principal Discharge DX:	Right acetabular fracture  Secondary Diagnosis:	Pubic ramus fracture  Secondary Diagnosis:	Sacral fracture
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
## 2022-09-28 NOTE — ED PROVIDER NOTE - CONSTITUTIONAL, MLM
normal... Well appearing, awake, alert, oriented to person, place, time/situation and in no apparent distress. Nontoxic appearing. AAOx4.

## 2022-09-28 NOTE — ED PROVIDER NOTE - PATIENT PORTAL LINK FT
You can access the FollowMyHealth Patient Portal offered by [facility] by registering at the following website: [email or web address]. By joining [employer]’s FollowMyHealth portal, you will also be able to view your health information using other applications (apps) compatible with our system.
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
## 2022-09-28 NOTE — ED PROVIDER NOTE - CARE PROVIDERS DIRECT ADDRESSES
,[email or web address],DirectAddress_Unknown

## 2022-09-28 NOTE — ED PROVIDER NOTE - PROVIDER TOKENS
PROVIDER:[TOKEN:[[id number]]] PROVIDER:[TOKEN:[[id number]]],PROVIDER:[TOKEN:[[id number]]]

## 2022-09-28 NOTE — ED PROVIDER NOTE - MUSCULOSKELETAL, MLM
Spine appears normal, range of motion is not limited, no muscle or joint tenderness. 5/5 strength on flexion and extension of all limbs. No nuchal rigidity. No saddle anesthesia. No laxity of any joint.
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
## 2022-09-28 NOTE — ED PROVIDER NOTE - CLINICAL SUMMARY MEDICAL DECISION MAKING FREE TEXT BOX
Kourtney AVILES: I signed out the care of this patient pending CT read, and reassessment to Dr. Aguilar. I said goodbye to patient, and explained the care thus far, and at the time of my sign out patient was happy with care provided. Please see follow up progress note and documentation for conclusion of care.

## 2022-09-28 NOTE — ED ADULT NURSE NOTE - NSIMPLEMENTINTERV_GEN_ALL_ED
Implemented All Fall Risk Interventions:  [place] to call system. Call bell, personal items and telephone within reach. Instruct patient to call for assistance. Room bathroom lighting operational. Non-slip footwear when patient is off stretcher. Physically safe environment: no spills, clutter or unnecessary equipment. Stretcher in lowest position, wheels locked, appropriate side rails in place. Provide visual cue, wrist band, yellow gown, etc. Monitor gait and stability. Monitor for mental status changes and reorient to person, place, and time. Review medications for side effects contributing to fall risk. Reinforce activity limits and safety measures with patient and family.

## 2022-10-03 ENCOUNTER — APPOINTMENT (OUTPATIENT)
Dept: ORTHOPEDIC SURGERY | Facility: CLINIC | Age: 37
End: 2022-10-03

## 2022-10-03 ENCOUNTER — NON-APPOINTMENT (OUTPATIENT)
Age: 37
End: 2022-10-03

## 2022-10-03 PROCEDURE — G0413: CPT

## 2022-10-03 PROCEDURE — 99203 OFFICE O/P NEW LOW 30 MIN: CPT | Mod: 25

## 2022-10-03 PROCEDURE — 72190 X-RAY EXAM OF PELVIS: CPT

## 2022-10-03 NOTE — PHYSICAL EXAM
[de-identified] : CT scan taken at [facility] reviewed–minimally displaced right pubic rami fractures including high pubic rami fracture, minimally displaced right sacral ala fracture, no widening of the SI joint or pubic symphysis\par \par AP pelvis and inlet outlet views taken today in the office reviewed–no further displacement of any fractures appreciated
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
## 2022-10-03 NOTE — DISCUSSION/SUMMARY
[de-identified] : 37-year-old female with right pubic rami fractures and minimally displaced sacral ala fracture\par \par Discussed the natural history of this injury.  Discussed that the patient may use crutches for pain control but she does not need to use them.  Recommend cane or other supportive assistance while she is ambulating.  Fractures appear stable however.  We discussed that these fractures will heal with conservative treatment.  I would like to see the patient back in 6 weeks for reevaluation.  Patient will return to the office sooner if pain worsens.

## 2022-10-20 ENCOUNTER — APPOINTMENT (OUTPATIENT)
Dept: GASTROENTEROLOGY | Facility: CLINIC | Age: 37
End: 2022-10-20

## 2022-10-20 ENCOUNTER — NON-APPOINTMENT (OUTPATIENT)
Age: 37
End: 2022-10-20

## 2022-10-20 VITALS
HEART RATE: 90 BPM | DIASTOLIC BLOOD PRESSURE: 97 MMHG | WEIGHT: 130 LBS | SYSTOLIC BLOOD PRESSURE: 137 MMHG | BODY MASS INDEX: 24.55 KG/M2 | HEIGHT: 61 IN

## 2022-10-20 DIAGNOSIS — R74.01 ELEVATION OF LEVELS OF LIVER TRANSAMINASE LEVELS: ICD-10-CM

## 2022-10-20 PROCEDURE — 99203 OFFICE O/P NEW LOW 30 MIN: CPT

## 2022-10-20 NOTE — PHYSICAL EXAM
[Alert] : alert [Normal Voice/Communication] : normal voice/communication [Healthy Appearing] : healthy appearing [No Acute Distress] : no acute distress [Sclera] : the sclera and conjunctiva were normal [Hearing Threshold Finger Rub Not [place]] : hearing was normal [Normal Lips/Gums] : the lips and gums were normal [Oropharynx] : the oropharynx was normal [Normal Appearance] : the appearance of the neck was normal [No Neck Mass] : no neck mass was observed [No Respiratory Distress] : no respiratory distress [No Acc Muscle Use] : no accessory muscle use [Respiration, Rhythm And Depth] : normal respiratory rhythm and effort [Auscultation Breath Sounds / Voice Sounds] : lungs were clear to auscultation bilaterally [Heart Rate And Rhythm] : heart rate was normal and rhythm regular [Normal S1, S2] : normal S1 and S2 [Murmurs] : no murmurs [Bowel Sounds] : normal bowel sounds [Abdomen Tenderness] : non-tender [No Masses] : no abdominal mass palpated [Abdomen Soft] : soft [] : no hepatosplenomegaly [Oriented To Time, Place, And Person] : oriented to person, place, and time [de-identified] : well appearing female walking with a cane

## 2022-10-20 NOTE — ASSESSMENT
[FreeTextEntry1] : 38 yo female with history of incidental elevated liver enzymes. Will obtain serologic testing. Follow up \par depending on findings.
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
## 2022-10-21 ENCOUNTER — LABORATORY RESULT (OUTPATIENT)
Age: 37
End: 2022-10-21

## 2022-11-01 LAB
ALBUMIN SERPL ELPH-MCNC: 4.6 G/DL
ALP BLD-CCNC: 127 U/L
ALT SERPL-CCNC: 14 U/L
ANA PAT FLD IF-IMP: ABNORMAL
ANACR T: ABNORMAL
ANION GAP SERPL CALC-SCNC: 16 MMOL/L
AST SERPL-CCNC: 16 U/L
BILIRUB SERPL-MCNC: 0.2 MG/DL
BUN SERPL-MCNC: 14 MG/DL
CALCIUM SERPL-MCNC: 9.5 MG/DL
CERULOPLASMIN SERPL-MCNC: 29 MG/DL
CHLORIDE SERPL-SCNC: 101 MMOL/L
CO2 SERPL-SCNC: 19 MMOL/L
CREAT SERPL-MCNC: 0.54 MG/DL
EGFR: 122 ML/MIN/1.73M2
FERRITIN SERPL-MCNC: 94 NG/ML
HBV CORE IGM SER QL: NONREACTIVE
HBV SURFACE AG SER QL: NONREACTIVE
HCV AB SER QL: NONREACTIVE
HCV S/CO RATIO: 0.13 S/CO
INR PPP: 0.96 RATIO
IRON SATN MFR SERPL: 13 %
IRON SERPL-MCNC: 55 UG/DL
POTASSIUM SERPL-SCNC: 4.3 MMOL/L
PROT SERPL-MCNC: 7.9 G/DL
PT BLD: 11.1 SEC
SMOOTH MUSCLE AB SER QL IF: NORMAL
SODIUM SERPL-SCNC: 137 MMOL/L
TIBC SERPL-MCNC: 420 UG/DL
UIBC SERPL-MCNC: 365 UG/DL

## 2022-11-14 ENCOUNTER — APPOINTMENT (OUTPATIENT)
Dept: ORTHOPEDIC SURGERY | Facility: CLINIC | Age: 37
End: 2022-11-14

## 2022-11-14 PROCEDURE — 99024 POSTOP FOLLOW-UP VISIT: CPT

## 2022-11-14 NOTE — PHYSICAL EXAM
[de-identified] : General Appearance: normal without acute distress\par Mental: Alert and oriented x 3\par Psych/affect: appropriate, cooperative\par Gait: Mildly antalgic gait with cane\par \par Right hip\par Painless range of motion, no pain with deep flexion internal rotation\par No pain with axial load\par Nontender palpation
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
## 2022-11-14 NOTE — DISCUSSION/SUMMARY
[de-identified] : 37-year-old female with right pubic rami fractures and minimally displaced sacral ala fracture\par \par Discussed the natural history of this injury.  The fracture should be nearly healed by now the patient can be weaning off of the cane.  I did give her a prescription for formal physical therapy.  Due to her recent pregnancy we will avoid repeat x-rays.  If patient has any concerns she can follow-up as needed.

## 2022-11-17 ENCOUNTER — NON-APPOINTMENT (OUTPATIENT)
Age: 37
End: 2022-11-17

## 2022-12-06 ENCOUNTER — APPOINTMENT (OUTPATIENT)
Dept: OBGYN | Facility: CLINIC | Age: 37
End: 2022-12-06

## 2022-12-06 VITALS
HEIGHT: 61 IN | SYSTOLIC BLOOD PRESSURE: 133 MMHG | HEART RATE: 90 BPM | WEIGHT: 136 LBS | BODY MASS INDEX: 25.68 KG/M2 | DIASTOLIC BLOOD PRESSURE: 84 MMHG

## 2022-12-06 DIAGNOSIS — N91.2 AMENORRHEA, UNSPECIFIED: ICD-10-CM

## 2022-12-06 PROCEDURE — 99072 ADDL SUPL MATRL&STAF TM PHE: CPT

## 2022-12-06 PROCEDURE — 76817 TRANSVAGINAL US OBSTETRIC: CPT

## 2022-12-06 PROCEDURE — 99214 OFFICE O/P EST MOD 30 MIN: CPT

## 2022-12-06 NOTE — COUNSELING
[Pregnancy Options] : pregnancy options [STD (testing, results, tx)] : STD (testing, results, tx) [Medication Management] : medication management

## 2022-12-07 LAB
ABO + RH PNL BLD: NORMAL
ALBUMIN SERPL ELPH-MCNC: 4.5 G/DL
ALP BLD-CCNC: 75 U/L
ALT SERPL-CCNC: 10 U/L
ANION GAP SERPL CALC-SCNC: 13 MMOL/L
AST SERPL-CCNC: 15 U/L
BASOPHILS # BLD AUTO: 0.05 K/UL
BASOPHILS NFR BLD AUTO: 0.5 %
BILIRUB SERPL-MCNC: <0.2 MG/DL
BLD GP AB SCN SERPL QL: NORMAL
BUN SERPL-MCNC: 8 MG/DL
CALCIUM SERPL-MCNC: 10.4 MG/DL
CHLORIDE SERPL-SCNC: 100 MMOL/L
CO2 SERPL-SCNC: 22 MMOL/L
CREAT SERPL-MCNC: 0.45 MG/DL
EGFR: 127 ML/MIN/1.73M2
EOSINOPHIL # BLD AUTO: 0.07 K/UL
EOSINOPHIL NFR BLD AUTO: 0.7 %
ESTIMATED AVERAGE GLUCOSE: 111 MG/DL
GLUCOSE SERPL-MCNC: 103 MG/DL
HBA1C MFR BLD HPLC: 5.5 %
HBV SURFACE AG SER QL: NONREACTIVE
HCT VFR BLD CALC: 42 %
HCV AB SER QL: NONREACTIVE
HCV S/CO RATIO: 0.11 S/CO
HGB A MFR BLD: 97.5 %
HGB A2 MFR BLD: 2.5 %
HGB BLD-MCNC: 13.4 G/DL
HGB FRACT BLD-IMP: NORMAL
HIV1+2 AB SPEC QL IA.RAPID: NONREACTIVE
IMM GRANULOCYTES NFR BLD AUTO: 0.4 %
LYMPHOCYTES # BLD AUTO: 1.91 K/UL
LYMPHOCYTES NFR BLD AUTO: 19.4 %
MAN DIFF?: NORMAL
MCHC RBC-ENTMCNC: 28.7 PG
MCHC RBC-ENTMCNC: 31.9 GM/DL
MCV RBC AUTO: 89.9 FL
MONOCYTES # BLD AUTO: 0.59 K/UL
MONOCYTES NFR BLD AUTO: 6 %
NEUTROPHILS # BLD AUTO: 7.19 K/UL
NEUTROPHILS NFR BLD AUTO: 73 %
PLATELET # BLD AUTO: 271 K/UL
POTASSIUM SERPL-SCNC: 3.6 MMOL/L
PROT SERPL-MCNC: 7.5 G/DL
RBC # BLD: 4.67 M/UL
RBC # FLD: 13.9 %
SODIUM SERPL-SCNC: 135 MMOL/L
TSH SERPL-ACNC: 1.61 UIU/ML
WBC # FLD AUTO: 9.85 K/UL

## 2022-12-08 ENCOUNTER — TRANSCRIPTION ENCOUNTER (OUTPATIENT)
Age: 37
End: 2022-12-08

## 2022-12-08 LAB
B19V IGG SER QL IA: 0.42 INDEX
B19V IGG+IGM SER-IMP: NEGATIVE
B19V IGG+IGM SER-IMP: NORMAL
B19V IGM FLD-ACNC: 0.1 INDEX
B19V IGM SER-ACNC: NEGATIVE
BACTERIA UR CULT: NORMAL
C TRACH RRNA SPEC QL NAA+PROBE: NOT DETECTED
LEAD BLD-MCNC: <1 UG/DL
M TB IFN-G BLD-IMP: ABNORMAL
MEV IGG FLD QL IA: 63.6 AU/ML
MEV IGG+IGM SER-IMP: POSITIVE
N GONORRHOEA RRNA SPEC QL NAA+PROBE: NOT DETECTED
QUANTIFERON TB PLUS MITOGEN MINUS NIL: 0.27 IU/ML
QUANTIFERON TB PLUS NIL: 0.01 IU/ML
QUANTIFERON TB PLUS TB1 MINUS NIL: 0 IU/ML
QUANTIFERON TB PLUS TB2 MINUS NIL: 0 IU/ML
RUBV IGG FLD-ACNC: 5.2 INDEX
RUBV IGG SER-IMP: POSITIVE
SOURCE AMPLIFICATION: NORMAL
T GONDII AB SER-IMP: NEGATIVE
T GONDII AB SER-IMP: NEGATIVE
T GONDII IGG SER QL: <3 IU/ML
T GONDII IGM SER QL: <3 AU/ML
T PALLIDUM AB SER QL IA: NEGATIVE
VZV AB TITR SER: POSITIVE
VZV IGG SER IF-ACNC: 704 INDEX
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
## 2022-12-08 NOTE — PHYSICAL EXAM
[Chaperone Present] : A chaperone was present in the examining room during all aspects of the physical examination [Appropriately responsive] : appropriately responsive [Alert] : alert [No Acute Distress] : no acute distress [No Lymphadenopathy] : no lymphadenopathy [Regular Rate Rhythm] : regular rate rhythm [No Murmurs] : no murmurs [Clear to Auscultation B/L] : clear to auscultation bilaterally [Soft] : soft [Non-tender] : non-tender [Non-distended] : non-distended [No HSM] : No HSM [No Lesions] : no lesions [No Mass] : no mass [Oriented x3] : oriented x3 [Labia Majora] : normal [Labia Minora] : normal [Normal] : normal [Uterine Adnexae] : normal [FreeTextEntry1] : CHRISTINA Mueller

## 2022-12-19 LAB — CYTOLOGY CVX/VAG DOC THIN PREP: NORMAL

## 2023-01-03 ENCOUNTER — OUTPATIENT (OUTPATIENT)
Dept: OUTPATIENT SERVICES | Facility: HOSPITAL | Age: 38
LOS: 1 days | End: 2023-01-03
Payer: COMMERCIAL

## 2023-01-03 ENCOUNTER — APPOINTMENT (OUTPATIENT)
Dept: OBGYN | Facility: CLINIC | Age: 38
End: 2023-01-03
Payer: OTHER GOVERNMENT

## 2023-01-03 ENCOUNTER — APPOINTMENT (OUTPATIENT)
Dept: RADIOLOGY | Facility: CLINIC | Age: 38
End: 2023-01-03
Payer: OTHER GOVERNMENT

## 2023-01-03 VITALS
DIASTOLIC BLOOD PRESSURE: 82 MMHG | WEIGHT: 135 LBS | HEIGHT: 61 IN | SYSTOLIC BLOOD PRESSURE: 126 MMHG | BODY MASS INDEX: 25.49 KG/M2

## 2023-01-03 DIAGNOSIS — Z34.91 ENCOUNTER FOR SUPERVISION OF NORMAL PREGNANCY, UNSPECIFIED, FIRST TRIMESTER: ICD-10-CM

## 2023-01-03 PROCEDURE — 0500F INITIAL PRENATAL CARE VISIT: CPT

## 2023-01-03 PROCEDURE — 71046 X-RAY EXAM CHEST 2 VIEWS: CPT | Mod: 26

## 2023-01-03 PROCEDURE — 71046 X-RAY EXAM CHEST 2 VIEWS: CPT

## 2023-01-05 ENCOUNTER — ASOB RESULT (OUTPATIENT)
Age: 38
End: 2023-01-05

## 2023-01-05 ENCOUNTER — APPOINTMENT (OUTPATIENT)
Dept: ANTEPARTUM | Facility: CLINIC | Age: 38
End: 2023-01-05
Payer: OTHER GOVERNMENT

## 2023-01-05 ENCOUNTER — LABORATORY RESULT (OUTPATIENT)
Age: 38
End: 2023-01-05

## 2023-01-05 PROCEDURE — 76801 OB US < 14 WKS SINGLE FETUS: CPT

## 2023-01-05 PROCEDURE — 76813 OB US NUCHAL MEAS 1 GEST: CPT | Mod: 59

## 2023-01-05 PROCEDURE — 36415 COLL VENOUS BLD VENIPUNCTURE: CPT

## 2023-01-05 PROCEDURE — 99072 ADDL SUPL MATRL&STAF TM PHE: CPT

## 2023-01-24 ENCOUNTER — NON-APPOINTMENT (OUTPATIENT)
Age: 38
End: 2023-01-24

## 2023-02-03 ENCOUNTER — NON-APPOINTMENT (OUTPATIENT)
Age: 38
End: 2023-02-03

## 2023-02-03 ENCOUNTER — APPOINTMENT (OUTPATIENT)
Dept: OBGYN | Facility: CLINIC | Age: 38
End: 2023-02-03
Payer: OTHER GOVERNMENT

## 2023-02-03 VITALS
WEIGHT: 141 LBS | HEIGHT: 61 IN | SYSTOLIC BLOOD PRESSURE: 110 MMHG | BODY MASS INDEX: 26.62 KG/M2 | DIASTOLIC BLOOD PRESSURE: 83 MMHG

## 2023-02-03 PROCEDURE — 90686 IIV4 VACC NO PRSV 0.5 ML IM: CPT

## 2023-02-03 PROCEDURE — G0008: CPT

## 2023-02-03 PROCEDURE — 0502F SUBSEQUENT PRENATAL CARE: CPT

## 2023-02-22 ENCOUNTER — NON-APPOINTMENT (OUTPATIENT)
Age: 38
End: 2023-02-22

## 2023-02-23 ENCOUNTER — APPOINTMENT (OUTPATIENT)
Dept: ANTEPARTUM | Facility: CLINIC | Age: 38
End: 2023-02-23
Payer: OTHER GOVERNMENT

## 2023-02-23 ENCOUNTER — ASOB RESULT (OUTPATIENT)
Age: 38
End: 2023-02-23

## 2023-02-23 DIAGNOSIS — Z36.0 ENCOUNTER FOR ANTENATAL SCREENING FOR CHROMOSOMAL ANOMALIES: ICD-10-CM

## 2023-02-23 PROCEDURE — 76811 OB US DETAILED SNGL FETUS: CPT

## 2023-02-23 PROCEDURE — 99072 ADDL SUPL MATRL&STAF TM PHE: CPT

## 2023-02-27 LAB
AFP MOM: 0.62
AFP VALUE: 38.6 NG/ML
ALPHA FETOPROTEIN SERUM COMMENT: NORMAL
ALPHA FETOPROTEIN SERUM INTERPRETATION: NORMAL
ALPHA FETOPROTEIN SERUM RESULTS: NORMAL
ALPHA FETOPROTEIN SERUM TEST RESULTS: NORMAL
GESTATIONAL AGE BASED ON: NORMAL
GESTATIONAL AGE ON COLLECTION DATE: 20.1 WEEKS
INSULIN DEP DIABETES: NO
MATERNAL AGE AT EDD AFP: 38.4 YR
MULTIPLE GESTATION: NO
OSBR RISK 1 IN: NORMAL
RACE: NORMAL
WEIGHT AFP: 142 LBS

## 2023-02-28 ENCOUNTER — APPOINTMENT (OUTPATIENT)
Dept: OBGYN | Facility: CLINIC | Age: 38
End: 2023-02-28
Payer: OTHER GOVERNMENT

## 2023-02-28 VITALS
HEART RATE: 94 BPM | DIASTOLIC BLOOD PRESSURE: 82 MMHG | SYSTOLIC BLOOD PRESSURE: 123 MMHG | BODY MASS INDEX: 27.38 KG/M2 | WEIGHT: 145 LBS | HEIGHT: 61 IN

## 2023-02-28 PROCEDURE — 0502F SUBSEQUENT PRENATAL CARE: CPT

## 2023-03-02 ENCOUNTER — ASOB RESULT (OUTPATIENT)
Age: 38
End: 2023-03-02

## 2023-03-02 ENCOUNTER — APPOINTMENT (OUTPATIENT)
Dept: ANTEPARTUM | Facility: CLINIC | Age: 38
End: 2023-03-02
Payer: OTHER GOVERNMENT

## 2023-03-02 PROCEDURE — 76816 OB US FOLLOW-UP PER FETUS: CPT

## 2023-03-02 PROCEDURE — 99072 ADDL SUPL MATRL&STAF TM PHE: CPT

## 2023-03-28 ENCOUNTER — NON-APPOINTMENT (OUTPATIENT)
Age: 38
End: 2023-03-28

## 2023-03-28 ENCOUNTER — APPOINTMENT (OUTPATIENT)
Dept: OBGYN | Facility: CLINIC | Age: 38
End: 2023-03-28
Payer: OTHER GOVERNMENT

## 2023-03-28 VITALS
BODY MASS INDEX: 28.32 KG/M2 | HEIGHT: 61 IN | WEIGHT: 150 LBS | HEART RATE: 105 BPM | SYSTOLIC BLOOD PRESSURE: 126 MMHG | DIASTOLIC BLOOD PRESSURE: 81 MMHG

## 2023-03-28 DIAGNOSIS — Z34.92 ENCOUNTER FOR SUPERVISION OF NORMAL PREGNANCY, UNSPECIFIED, SECOND TRIMESTER: ICD-10-CM

## 2023-03-28 PROCEDURE — 0502F SUBSEQUENT PRENATAL CARE: CPT

## 2023-03-29 ENCOUNTER — NON-APPOINTMENT (OUTPATIENT)
Age: 38
End: 2023-03-29

## 2023-03-29 LAB
BASOPHILS # BLD AUTO: 0.03 K/UL
BASOPHILS NFR BLD AUTO: 0.3 %
EOSINOPHIL # BLD AUTO: 0.16 K/UL
EOSINOPHIL NFR BLD AUTO: 1.6 %
GLUCOSE 1H P 100 G GLC PO SERPL-MCNC: 182 MG/DL
HCT VFR BLD CALC: 34.2 %
HGB BLD-MCNC: 11.2 G/DL
IMM GRANULOCYTES NFR BLD AUTO: 0.8 %
LYMPHOCYTES # BLD AUTO: 1.98 K/UL
LYMPHOCYTES NFR BLD AUTO: 19.5 %
MAN DIFF?: NORMAL
MCHC RBC-ENTMCNC: 29.6 PG
MCHC RBC-ENTMCNC: 32.7 GM/DL
MCV RBC AUTO: 90.5 FL
MONOCYTES # BLD AUTO: 0.49 K/UL
MONOCYTES NFR BLD AUTO: 4.8 %
NEUTROPHILS # BLD AUTO: 7.39 K/UL
NEUTROPHILS NFR BLD AUTO: 73 %
PLATELET # BLD AUTO: 244 K/UL
RBC # BLD: 3.78 M/UL
RBC # FLD: 13.7 %
T PALLIDUM AB SER QL IA: NEGATIVE
WBC # FLD AUTO: 10.13 K/UL

## 2023-03-31 ENCOUNTER — NON-APPOINTMENT (OUTPATIENT)
Age: 38
End: 2023-03-31

## 2023-04-07 ENCOUNTER — NON-APPOINTMENT (OUTPATIENT)
Age: 38
End: 2023-04-07

## 2023-04-12 ENCOUNTER — APPOINTMENT (OUTPATIENT)
Dept: MATERNAL FETAL MEDICINE | Facility: CLINIC | Age: 38
End: 2023-04-12
Payer: OTHER GOVERNMENT

## 2023-04-12 ENCOUNTER — ASOB RESULT (OUTPATIENT)
Age: 38
End: 2023-04-12

## 2023-04-12 PROCEDURE — G0109 DIAB MANAGE TRN IND/GROUP: CPT | Mod: 95

## 2023-04-12 RX ORDER — LANCETS 33 GAUGE
EACH MISCELLANEOUS
Qty: 4 | Refills: 4 | Status: ACTIVE | COMMUNITY
Start: 2023-04-12 | End: 1900-01-01

## 2023-04-12 RX ORDER — PEN NEEDLE, DIABETIC 32GX 5/32"
32G X 4 MM NEEDLE, DISPOSABLE MISCELLANEOUS
Qty: 2 | Refills: 1 | Status: ACTIVE | COMMUNITY
Start: 2023-04-12 | End: 1900-01-01

## 2023-04-12 RX ORDER — BLOOD-GLUCOSE METER
W/DEVICE KIT MISCELLANEOUS
Qty: 1 | Refills: 0 | Status: ACTIVE | COMMUNITY
Start: 2023-04-12 | End: 1900-01-01

## 2023-04-12 RX ORDER — ISOPROPYL ALCOHOL 0.7 ML/ML
SWAB TOPICAL
Qty: 1 | Refills: 0 | Status: ACTIVE | COMMUNITY
Start: 2023-04-12 | End: 1900-01-01

## 2023-04-12 RX ORDER — URINE ACETONE TEST STRIPS
STRIP MISCELLANEOUS
Qty: 1 | Refills: 2 | Status: ACTIVE | COMMUNITY
Start: 2023-04-12 | End: 1900-01-01

## 2023-04-12 RX ORDER — BLOOD-GLUCOSE METER
KIT MISCELLANEOUS
Qty: 2 | Refills: 0 | Status: ACTIVE | COMMUNITY
Start: 2023-04-12 | End: 1900-01-01

## 2023-04-20 ENCOUNTER — ASOB RESULT (OUTPATIENT)
Age: 38
End: 2023-04-20

## 2023-04-20 ENCOUNTER — APPOINTMENT (OUTPATIENT)
Dept: MATERNAL FETAL MEDICINE | Facility: CLINIC | Age: 38
End: 2023-04-20
Payer: OTHER GOVERNMENT

## 2023-04-20 PROCEDURE — G0108 DIAB MANAGE TRN  PER INDIV: CPT | Mod: 95

## 2023-04-20 RX ORDER — HUMAN INSULIN 100 [IU]/ML
100 INJECTION, SUSPENSION SUBCUTANEOUS
Qty: 1 | Refills: 0 | Status: ACTIVE | COMMUNITY
Start: 2023-04-12 | End: 1900-01-01

## 2023-04-25 ENCOUNTER — APPOINTMENT (OUTPATIENT)
Dept: OBGYN | Facility: CLINIC | Age: 38
End: 2023-04-25
Payer: OTHER GOVERNMENT

## 2023-04-25 ENCOUNTER — NON-APPOINTMENT (OUTPATIENT)
Age: 38
End: 2023-04-25

## 2023-04-25 VITALS
WEIGHT: 154 LBS | HEIGHT: 61 IN | BODY MASS INDEX: 29.07 KG/M2 | SYSTOLIC BLOOD PRESSURE: 108 MMHG | DIASTOLIC BLOOD PRESSURE: 73 MMHG

## 2023-04-25 DIAGNOSIS — O24.419 GESTATIONAL DIABETES MELLITUS IN PREGNANCY, UNSPECIFIED CONTROL: ICD-10-CM

## 2023-04-25 PROCEDURE — 0502F SUBSEQUENT PRENATAL CARE: CPT

## 2023-04-25 PROCEDURE — 90715 TDAP VACCINE 7 YRS/> IM: CPT

## 2023-04-25 PROCEDURE — 90471 IMMUNIZATION ADMIN: CPT

## 2023-04-28 ENCOUNTER — ASOB RESULT (OUTPATIENT)
Age: 38
End: 2023-04-28

## 2023-04-28 ENCOUNTER — APPOINTMENT (OUTPATIENT)
Dept: MATERNAL FETAL MEDICINE | Facility: CLINIC | Age: 38
End: 2023-04-28
Payer: OTHER GOVERNMENT

## 2023-04-28 PROCEDURE — G0108 DIAB MANAGE TRN  PER INDIV: CPT | Mod: 95

## 2023-05-04 ENCOUNTER — NON-APPOINTMENT (OUTPATIENT)
Age: 38
End: 2023-05-04

## 2023-05-11 ENCOUNTER — ASOB RESULT (OUTPATIENT)
Age: 38
End: 2023-05-11

## 2023-05-11 ENCOUNTER — APPOINTMENT (OUTPATIENT)
Dept: MATERNAL FETAL MEDICINE | Facility: CLINIC | Age: 38
End: 2023-05-11
Payer: COMMERCIAL

## 2023-05-11 PROCEDURE — G0108 DIAB MANAGE TRN  PER INDIV: CPT | Mod: 95

## 2023-05-15 ENCOUNTER — NON-APPOINTMENT (OUTPATIENT)
Age: 38
End: 2023-05-15

## 2023-05-16 ENCOUNTER — APPOINTMENT (OUTPATIENT)
Dept: OBGYN | Facility: CLINIC | Age: 38
End: 2023-05-16
Payer: OTHER GOVERNMENT

## 2023-05-16 ENCOUNTER — NON-APPOINTMENT (OUTPATIENT)
Age: 38
End: 2023-05-16

## 2023-05-16 VITALS
HEIGHT: 61 IN | DIASTOLIC BLOOD PRESSURE: 79 MMHG | SYSTOLIC BLOOD PRESSURE: 118 MMHG | WEIGHT: 153 LBS | HEART RATE: 106 BPM | BODY MASS INDEX: 28.89 KG/M2

## 2023-05-16 PROCEDURE — 0502F SUBSEQUENT PRENATAL CARE: CPT

## 2023-05-17 ENCOUNTER — ASOB RESULT (OUTPATIENT)
Age: 38
End: 2023-05-17

## 2023-05-17 ENCOUNTER — APPOINTMENT (OUTPATIENT)
Dept: ANTEPARTUM | Facility: CLINIC | Age: 38
End: 2023-05-17
Payer: COMMERCIAL

## 2023-05-17 PROCEDURE — 76816 OB US FOLLOW-UP PER FETUS: CPT

## 2023-05-17 PROCEDURE — 76819 FETAL BIOPHYS PROFIL W/O NST: CPT

## 2023-05-31 ENCOUNTER — APPOINTMENT (OUTPATIENT)
Dept: ANTEPARTUM | Facility: CLINIC | Age: 38
End: 2023-05-31
Payer: OTHER GOVERNMENT

## 2023-05-31 ENCOUNTER — ASOB RESULT (OUTPATIENT)
Age: 38
End: 2023-05-31

## 2023-05-31 PROCEDURE — 76819 FETAL BIOPHYS PROFIL W/O NST: CPT

## 2023-05-31 PROCEDURE — 76816 OB US FOLLOW-UP PER FETUS: CPT

## 2023-06-01 ENCOUNTER — APPOINTMENT (OUTPATIENT)
Dept: MATERNAL FETAL MEDICINE | Facility: CLINIC | Age: 38
End: 2023-06-01
Payer: OTHER GOVERNMENT

## 2023-06-01 ENCOUNTER — ASOB RESULT (OUTPATIENT)
Age: 38
End: 2023-06-01

## 2023-06-01 PROCEDURE — G0108 DIAB MANAGE TRN  PER INDIV: CPT | Mod: 95

## 2023-06-06 ENCOUNTER — NON-APPOINTMENT (OUTPATIENT)
Age: 38
End: 2023-06-06

## 2023-06-06 ENCOUNTER — APPOINTMENT (OUTPATIENT)
Dept: OBGYN | Facility: CLINIC | Age: 38
End: 2023-06-06
Payer: OTHER GOVERNMENT

## 2023-06-06 VITALS
HEART RATE: 105 BPM | SYSTOLIC BLOOD PRESSURE: 121 MMHG | BODY MASS INDEX: 28.51 KG/M2 | HEIGHT: 61 IN | WEIGHT: 151 LBS | DIASTOLIC BLOOD PRESSURE: 79 MMHG

## 2023-06-06 PROCEDURE — 0502F SUBSEQUENT PRENATAL CARE: CPT

## 2023-06-13 ENCOUNTER — APPOINTMENT (OUTPATIENT)
Dept: ORTHOPEDIC SURGERY | Facility: CLINIC | Age: 38
End: 2023-06-13
Payer: OTHER GOVERNMENT

## 2023-06-13 PROCEDURE — 99213 OFFICE O/P EST LOW 20 MIN: CPT

## 2023-06-13 NOTE — PHYSICAL EXAM
[de-identified] : \par Right hip\par Painless range of motion, no pain with deep flexion internal rotation\par No pain with axial load\par Mildly tender palpation around SI joint and PSIS [de-identified] : Imaging deferred due to patient's pregnancy status.
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
## 2023-06-19 ENCOUNTER — APPOINTMENT (OUTPATIENT)
Dept: MATERNAL FETAL MEDICINE | Facility: CLINIC | Age: 38
End: 2023-06-19
Payer: OTHER GOVERNMENT

## 2023-06-19 ENCOUNTER — ASOB RESULT (OUTPATIENT)
Age: 38
End: 2023-06-19

## 2023-06-19 PROCEDURE — G0108 DIAB MANAGE TRN  PER INDIV: CPT | Mod: 95

## 2023-06-20 ENCOUNTER — APPOINTMENT (OUTPATIENT)
Dept: ANTEPARTUM | Facility: CLINIC | Age: 38
End: 2023-06-20
Payer: OTHER GOVERNMENT

## 2023-06-20 ENCOUNTER — ASOB RESULT (OUTPATIENT)
Age: 38
End: 2023-06-20

## 2023-06-20 ENCOUNTER — APPOINTMENT (OUTPATIENT)
Dept: OBGYN | Facility: CLINIC | Age: 38
End: 2023-06-20
Payer: OTHER GOVERNMENT

## 2023-06-20 VITALS
HEART RATE: 96 BPM | HEIGHT: 61 IN | DIASTOLIC BLOOD PRESSURE: 77 MMHG | BODY MASS INDEX: 29.83 KG/M2 | WEIGHT: 158 LBS | SYSTOLIC BLOOD PRESSURE: 118 MMHG

## 2023-06-20 DIAGNOSIS — Z34.93 ENCOUNTER FOR SUPERVISION OF NORMAL PREGNANCY, UNSPECIFIED, THIRD TRIMESTER: ICD-10-CM

## 2023-06-20 PROCEDURE — 76816 OB US FOLLOW-UP PER FETUS: CPT

## 2023-06-20 PROCEDURE — 76819 FETAL BIOPHYS PROFIL W/O NST: CPT

## 2023-06-20 PROCEDURE — 0502F SUBSEQUENT PRENATAL CARE: CPT

## 2023-06-21 PROBLEM — Z34.93 THIRD TRIMESTER PREGNANCY: Status: ACTIVE | Noted: 2023-06-06

## 2023-06-21 LAB — HIV1+2 AB SPEC QL IA.RAPID: NONREACTIVE

## 2023-06-23 LAB
GP B STREP DNA SPEC QL NAA+PROBE: NOT DETECTED
SOURCE GBS: NORMAL

## 2023-06-29 ENCOUNTER — INPATIENT (INPATIENT)
Facility: HOSPITAL | Age: 38
LOS: 1 days | Discharge: HOME | End: 2023-07-01
Attending: STUDENT IN AN ORGANIZED HEALTH CARE EDUCATION/TRAINING PROGRAM | Admitting: STUDENT IN AN ORGANIZED HEALTH CARE EDUCATION/TRAINING PROGRAM
Payer: OTHER GOVERNMENT

## 2023-06-29 ENCOUNTER — TRANSCRIPTION ENCOUNTER (OUTPATIENT)
Age: 38
End: 2023-06-29

## 2023-06-29 VITALS — SYSTOLIC BLOOD PRESSURE: 120 MMHG | DIASTOLIC BLOOD PRESSURE: 75 MMHG | HEART RATE: 96 BPM

## 2023-06-29 DIAGNOSIS — O42.90 PREMATURE RUPTURE OF MEMBRANES, UNSPECIFIED AS TO LENGTH OF TIME BETWEEN RUPTURE AND ONSET OF LABOR, UNSPECIFIED WEEKS OF GESTATION: ICD-10-CM

## 2023-06-29 DIAGNOSIS — Z90.49 ACQUIRED ABSENCE OF OTHER SPECIFIED PARTS OF DIGESTIVE TRACT: Chronic | ICD-10-CM

## 2023-06-29 DIAGNOSIS — O42.10 PREMATURE RUPTURE OF MEMBRANES, ONSET OF LABOR MORE THAN 24 HOURS FOLLOWING RUPTURE, UNSPECIFIED WEEKS OF GESTATION: ICD-10-CM

## 2023-06-29 LAB
BASOPHILS # BLD AUTO: 0.06 K/UL — SIGNIFICANT CHANGE UP (ref 0–0.2)
BASOPHILS NFR BLD AUTO: 0.5 % — SIGNIFICANT CHANGE UP (ref 0–2)
COVID-19 SPIKE DOMAIN AB INTERP: POSITIVE
COVID-19 SPIKE DOMAIN ANTIBODY RESULT: >250 U/ML — HIGH
EOSINOPHIL # BLD AUTO: 0.17 K/UL — SIGNIFICANT CHANGE UP (ref 0–0.5)
EOSINOPHIL NFR BLD AUTO: 1.5 % — SIGNIFICANT CHANGE UP (ref 0–6)
GLUCOSE BLDC GLUCOMTR-MCNC: 106 MG/DL — HIGH (ref 70–99)
GLUCOSE BLDC GLUCOMTR-MCNC: 78 MG/DL — SIGNIFICANT CHANGE UP (ref 70–99)
GLUCOSE BLDC GLUCOMTR-MCNC: 91 MG/DL — SIGNIFICANT CHANGE UP (ref 70–99)
GLUCOSE BLDC GLUCOMTR-MCNC: 95 MG/DL — SIGNIFICANT CHANGE UP (ref 70–99)
HCT VFR BLD CALC: 35.3 % — SIGNIFICANT CHANGE UP (ref 34.5–45)
HGB BLD-MCNC: 11.9 G/DL — SIGNIFICANT CHANGE UP (ref 11.5–15.5)
IANC: 7.82 K/UL — HIGH (ref 1.8–7.4)
IMM GRANULOCYTES NFR BLD AUTO: 0.9 % — SIGNIFICANT CHANGE UP (ref 0–0.9)
LYMPHOCYTES # BLD AUTO: 2.35 K/UL — SIGNIFICANT CHANGE UP (ref 1–3.3)
LYMPHOCYTES # BLD AUTO: 20.6 % — SIGNIFICANT CHANGE UP (ref 13–44)
MCHC RBC-ENTMCNC: 29 PG — SIGNIFICANT CHANGE UP (ref 27–34)
MCHC RBC-ENTMCNC: 33.7 GM/DL — SIGNIFICANT CHANGE UP (ref 32–36)
MCV RBC AUTO: 86.1 FL — SIGNIFICANT CHANGE UP (ref 80–100)
MONOCYTES # BLD AUTO: 0.89 K/UL — SIGNIFICANT CHANGE UP (ref 0–0.9)
MONOCYTES NFR BLD AUTO: 7.8 % — SIGNIFICANT CHANGE UP (ref 2–14)
NEUTROPHILS # BLD AUTO: 7.82 K/UL — HIGH (ref 1.8–7.4)
NEUTROPHILS NFR BLD AUTO: 68.7 % — SIGNIFICANT CHANGE UP (ref 43–77)
NRBC # BLD: 0 /100 WBCS — SIGNIFICANT CHANGE UP (ref 0–0)
NRBC # FLD: 0 K/UL — SIGNIFICANT CHANGE UP (ref 0–0)
PLATELET # BLD AUTO: 234 K/UL — SIGNIFICANT CHANGE UP (ref 150–400)
RBC # BLD: 4.1 M/UL — SIGNIFICANT CHANGE UP (ref 3.8–5.2)
RBC # FLD: 14.2 % — SIGNIFICANT CHANGE UP (ref 10.3–14.5)
SARS-COV-2 IGG+IGM SERPL QL IA: >250 U/ML — HIGH
SARS-COV-2 IGG+IGM SERPL QL IA: POSITIVE
T PALLIDUM AB TITR SER: NEGATIVE — SIGNIFICANT CHANGE UP
WBC # BLD: 11.39 K/UL — HIGH (ref 3.8–10.5)
WBC # FLD AUTO: 11.39 K/UL — HIGH (ref 3.8–10.5)

## 2023-06-29 PROCEDURE — 59410 OBSTETRICAL CARE: CPT | Mod: U9,GC

## 2023-06-29 RX ORDER — OXYTOCIN 10 UNIT/ML
41.67 VIAL (ML) INJECTION
Qty: 20 | Refills: 0 | Status: DISCONTINUED | OUTPATIENT
Start: 2023-06-29 | End: 2023-07-01

## 2023-06-29 RX ORDER — PRAMOXINE HYDROCHLORIDE 150 MG/15G
1 AEROSOL, FOAM RECTAL EVERY 4 HOURS
Refills: 0 | Status: DISCONTINUED | OUTPATIENT
Start: 2023-06-29 | End: 2023-07-01

## 2023-06-29 RX ORDER — CITRIC ACID/SODIUM CITRATE 300-500 MG
15 SOLUTION, ORAL ORAL EVERY 6 HOURS
Refills: 0 | Status: DISCONTINUED | OUTPATIENT
Start: 2023-06-29 | End: 2023-06-30

## 2023-06-29 RX ORDER — MAGNESIUM HYDROXIDE 400 MG/1
30 TABLET, CHEWABLE ORAL
Refills: 0 | Status: DISCONTINUED | OUTPATIENT
Start: 2023-06-29 | End: 2023-07-01

## 2023-06-29 RX ORDER — ACETAMINOPHEN 500 MG
975 TABLET ORAL
Refills: 0 | Status: DISCONTINUED | OUTPATIENT
Start: 2023-06-29 | End: 2023-07-01

## 2023-06-29 RX ORDER — SODIUM CHLORIDE 9 MG/ML
1000 INJECTION, SOLUTION INTRAVENOUS
Refills: 0 | Status: DISCONTINUED | OUTPATIENT
Start: 2023-06-29 | End: 2023-06-30

## 2023-06-29 RX ORDER — SODIUM CHLORIDE 9 MG/ML
3 INJECTION INTRAMUSCULAR; INTRAVENOUS; SUBCUTANEOUS EVERY 8 HOURS
Refills: 0 | Status: DISCONTINUED | OUTPATIENT
Start: 2023-06-29 | End: 2023-07-01

## 2023-06-29 RX ORDER — IBUPROFEN 200 MG
1 TABLET ORAL
Qty: 0 | Refills: 0 | DISCHARGE
Start: 2023-06-29

## 2023-06-29 RX ORDER — DIBUCAINE 1 %
1 OINTMENT (GRAM) RECTAL EVERY 6 HOURS
Refills: 0 | Status: DISCONTINUED | OUTPATIENT
Start: 2023-06-29 | End: 2023-07-01

## 2023-06-29 RX ORDER — KETOROLAC TROMETHAMINE 30 MG/ML
30 SYRINGE (ML) INJECTION ONCE
Refills: 0 | Status: DISCONTINUED | OUTPATIENT
Start: 2023-06-29 | End: 2023-06-30

## 2023-06-29 RX ORDER — BENZOCAINE 10 %
1 GEL (GRAM) MUCOUS MEMBRANE EVERY 6 HOURS
Refills: 0 | Status: DISCONTINUED | OUTPATIENT
Start: 2023-06-29 | End: 2023-07-01

## 2023-06-29 RX ORDER — OXYCODONE HYDROCHLORIDE 5 MG/1
5 TABLET ORAL ONCE
Refills: 0 | Status: DISCONTINUED | OUTPATIENT
Start: 2023-06-29 | End: 2023-07-01

## 2023-06-29 RX ORDER — SIMETHICONE 80 MG/1
80 TABLET, CHEWABLE ORAL EVERY 4 HOURS
Refills: 0 | Status: DISCONTINUED | OUTPATIENT
Start: 2023-06-29 | End: 2023-07-01

## 2023-06-29 RX ORDER — LANOLIN
1 OINTMENT (GRAM) TOPICAL EVERY 6 HOURS
Refills: 0 | Status: DISCONTINUED | OUTPATIENT
Start: 2023-06-29 | End: 2023-07-01

## 2023-06-29 RX ORDER — CHLORHEXIDINE GLUCONATE 213 G/1000ML
1 SOLUTION TOPICAL DAILY
Refills: 0 | Status: DISCONTINUED | OUTPATIENT
Start: 2023-06-29 | End: 2023-06-30

## 2023-06-29 RX ORDER — OXYTOCIN 10 UNIT/ML
2 VIAL (ML) INJECTION
Qty: 30 | Refills: 0 | Status: DISCONTINUED | OUTPATIENT
Start: 2023-06-29 | End: 2023-07-01

## 2023-06-29 RX ORDER — AER TRAVELER 0.5 G/1
1 SOLUTION RECTAL; TOPICAL EVERY 4 HOURS
Refills: 0 | Status: DISCONTINUED | OUTPATIENT
Start: 2023-06-29 | End: 2023-07-01

## 2023-06-29 RX ORDER — SODIUM CHLORIDE 9 MG/ML
1000 INJECTION INTRAMUSCULAR; INTRAVENOUS; SUBCUTANEOUS
Refills: 0 | Status: DISCONTINUED | OUTPATIENT
Start: 2023-06-29 | End: 2023-06-30

## 2023-06-29 RX ORDER — SODIUM CHLORIDE 9 MG/ML
1000 INJECTION, SOLUTION INTRAVENOUS ONCE
Refills: 0 | Status: DISCONTINUED | OUTPATIENT
Start: 2023-06-29 | End: 2023-06-30

## 2023-06-29 RX ORDER — OXYTOCIN 10 UNIT/ML
333.33 VIAL (ML) INJECTION
Qty: 20 | Refills: 0 | Status: DISCONTINUED | OUTPATIENT
Start: 2023-06-29 | End: 2023-07-01

## 2023-06-29 RX ORDER — OXYCODONE HYDROCHLORIDE 5 MG/1
5 TABLET ORAL
Refills: 0 | Status: DISCONTINUED | OUTPATIENT
Start: 2023-06-29 | End: 2023-07-01

## 2023-06-29 RX ORDER — DIPHENHYDRAMINE HCL 50 MG
25 CAPSULE ORAL EVERY 6 HOURS
Refills: 0 | Status: DISCONTINUED | OUTPATIENT
Start: 2023-06-29 | End: 2023-07-01

## 2023-06-29 RX ORDER — IBUPROFEN 200 MG
600 TABLET ORAL EVERY 6 HOURS
Refills: 0 | Status: COMPLETED | OUTPATIENT
Start: 2023-06-29 | End: 2024-05-27

## 2023-06-29 RX ORDER — TETANUS TOXOID, REDUCED DIPHTHERIA TOXOID AND ACELLULAR PERTUSSIS VACCINE, ADSORBED 5; 2.5; 8; 8; 2.5 [IU]/.5ML; [IU]/.5ML; UG/.5ML; UG/.5ML; UG/.5ML
0.5 SUSPENSION INTRAMUSCULAR ONCE
Refills: 0 | Status: DISCONTINUED | OUTPATIENT
Start: 2023-06-29 | End: 2023-07-01

## 2023-06-29 RX ORDER — HYDROCORTISONE 1 %
1 OINTMENT (GRAM) TOPICAL EVERY 6 HOURS
Refills: 0 | Status: DISCONTINUED | OUTPATIENT
Start: 2023-06-29 | End: 2023-07-01

## 2023-06-29 RX ADMIN — CHLORHEXIDINE GLUCONATE 1 APPLICATION(S): 213 SOLUTION TOPICAL at 13:20

## 2023-06-29 RX ADMIN — SODIUM CHLORIDE 125 MILLILITER(S): 9 INJECTION INTRAMUSCULAR; INTRAVENOUS; SUBCUTANEOUS at 10:12

## 2023-06-29 RX ADMIN — Medication 2 MILLIUNIT(S)/MIN: at 17:05

## 2023-06-29 NOTE — OB POSTPARTUM EVENT NOTE - NS_EVENTSUMMARY1_OBGYN_ALL_OB_FT
Upon fundal check pt has heavy bleeding. MD Varela notified and pt was scanned at bedside. Pt uterus is firm and 1 below.

## 2023-06-29 NOTE — DISCHARGE NOTE OB - PATIENT PORTAL LINK FT
You can access the FollowMyHealth Patient Portal offered by [facility] by registering at the following website: [email or web address]. By joining [employer]’s FollowMyHealth portal, you will also be able to view your health information using other applications (apps) compatible with our system.

## 2023-06-29 NOTE — OB PROVIDER LABOR PROGRESS NOTE - NS_SUBJECTIVE/OBJECTIVE_OBGYN_ALL_OB_FT
R1 Labor Note    S: Patient evaluated at bedside for cervical change. Patient comfortable without epidural.    O:  T(C): 36.8 (06-29-23 @ 15:55), Max: 37 (06-29-23 @ 05:58)  HR: 83 (06-29-23 @ 15:35) (83 - 96)  BP: 106/73 (06-29-23 @ 15:35) (101/55 - 120/75)  RR: 16 (06-29-23 @ 15:55) (16 - 18)

## 2023-06-29 NOTE — OB PROVIDER DELIVERY SUMMARY - NSPROVIDERDELIVERYNOTE_OBGYN_ALL_OB_FT
Spontaneous vaginal delivery of liveborn infant from BRETT position. Head, shoulders, and body delivered easily. Infant was suctioned. No mec. Delayed cord clamping was performed and infant was placed directly on mother's chest. Placenta delivered intact. Fundal massage was given and uterine fundus was found to be firm. Vaginal exam revealed an intact cervix, vaginal walls and sulci. Patient had a perineal abrasion but no laceration. Excellent hemostasis was noted. Patient was stable. Count was correct x 2. .    Dr. Correia present for delivery  Britany Varela PGY1 Spontaneous vaginal delivery of liveborn infant from BRETT position. Head, shoulders, and body delivered easily. Infant was suctioned. No mec. Delayed cord clamping was performed and infant was placed directly on mother's chest. Placenta delivered intact. Fundal massage was given and uterine fundus was found to be firm. Vaginal exam revealed an intact cervix, vaginal walls and sulci. Patient had a perineal abrasion but no laceration.  One 2-0 chromic stitch placed. Excellent hemostasis was noted. Patient was stable. Count was correct x 2. .    Dr. Correia present for delivery  Britany Varela PGY1

## 2023-06-29 NOTE — OB RN TRIAGE NOTE - FALL HARM RISK - UNIVERSAL INTERVENTIONS
Bed in lowest position, wheels locked, appropriate side rails in place/Call bell, personal items and telephone in reach/Instruct patient to call for assistance before getting out of bed or chair/Non-slip footwear when patient is out of bed/Imogene to call system/Physically safe environment - no spills, clutter or unnecessary equipment/Purposeful Proactive Rounding/Room/bathroom lighting operational, light cord in reach

## 2023-06-29 NOTE — OB PROVIDER H&P - NS_OBGYNHISTORY_OBGYN_ALL_OB_FT
3/29/19- , FT, 7-8 lbs, no complications  2010- TOP x1 with D&C  -denies history of fibroids, ovarian cysts, abnormal paps, STD's, herpes

## 2023-06-29 NOTE — OB PROVIDER H&P - ASSESSMENT
39 y/o , EDC , GA 38.1 weeks, evidence of PROM.  -Admit to Labor and Delivery, discussed with Dr. Correia  -Admission Consents obtained  -GBS Negative  -Buccal cytotec  -Fingerstick glucose- 91    ARABELLA Vera, PAC

## 2023-06-29 NOTE — OB PROVIDER DELIVERY SUMMARY - NSLOWPPHRISK_OBGYN_A_OB
needs device
No previous uterine incision/Josue Pregnancy/Less than or equal to 4 previous vaginal births/No known bleeding disorder/No history of postpartum hemorrhage

## 2023-06-29 NOTE — OB PROVIDER H&P - HISTORY OF PRESENT ILLNESS
37 y/o , EDC , GA 38.1 weeks, presents for leaking of clear fluid since 4:22 AM this morning with mucus and spotting of bright red blood. Reports continuous leaking. Denies contractions. Reports good FM.    Antepartum Course complicated by: [email or web address], takes fingersticks 4x/ day- fasting 88-95, after meals 144-160  GBS: Negative   OBGYN History:  3/29/19- , FT, 7-8 lbs, no complications  - TOP x1 with D&C  -denies history of fibroids, ovarian cysts, abnormal paps, STD's, herpes  Medical History: Denies  Surgical History: D&C , - appendectomy, no complications  Allergies: tetracycline- hives  Medications: PNV, Mg, Tums  Mental Health History: Denies  Alcohol/Drug/Tobacco Use: Denies

## 2023-06-29 NOTE — OB PROVIDER H&P - PROBLEM SELECTOR PLAN 1
39 y/o , EDC , GA 38.1 weeks, evidence of PROM.  -Admit to Labor and Delivery, discussed with Dr. Correia  -Admission Consents obtained  -GBS Negative  -Buccal cytotec    ARABELLA Vera, PAC

## 2023-06-29 NOTE — OB RN PATIENT PROFILE - FUNCTIONAL ASSESSMENT - BASIC MOBILITY SCORE HIDDEN
Pt states coughing x 4 days sometimes productive R rib pain with cough and sob.   Pt is on 3L O2 NC  Meds verified and pt states no changes.   PCP- Dr. eFlipe.   Denies known Latex allergy or symptoms of Latex sensitivity.     24

## 2023-06-29 NOTE — OB RN DELIVERY SUMMARY - NS_SEPSISRSKCALC_OBGYN_ALL_OB_FT
EOS calculated successfully. EOS Risk Factor: 0.5/1000 live births ([facility] national incidence); GA=38w1d; Temp=98.6; ROM=16.383; GBS='Negative'; Antibiotics='No antibiotics or any antibiotics < 2 hrs prior to birth'

## 2023-06-29 NOTE — OB PROVIDER H&P - NSHPPHYSICALEXAM_GEN_ALL_CORE
Vital Signs Last 24 Hrs  T(C): 37 (29 Jun 2023 05:58), Max: 37 (29 Jun 2023 05:58)  T(F): 98.6 (29 Jun 2023 05:58), Max: 98.6 (29 Jun 2023 05:58)  HR: 96 (29 Jun 2023 05:58) (96 - 96)  BP: 120/75 (29 Jun 2023 05:58) (120/75 - 120/75)  BP(mean): --  RR: 16 (29 Jun 2023 05:58) (16 - 16)  SpO2: --    Gen: A/O x3  Abd: Gravid uterus, toco in place   SSE: os appears open, pooling of clear fluid with mucus and scant bright red blood visualized, positive nitrazine  TAS: vertex  FHR: 125 baseline, moderate variability, with accelerations, no decelerations, reactive  CTX: irregular  SVE: 2/20/-3  EFW: 2900

## 2023-06-29 NOTE — DISCHARGE NOTE OB - MATERIALS PROVIDED
Vaccinations/[facility]  Screening Program/  Immunization Record/Breastfeeding Log/Bottle Feeding Log/Breastfeeding Mother’s Support Group Information/Guide to Postpartum Care/[facility] Hearing Screen Program/Back To Sleep Handout/Shaken Baby Prevention Handout/Breastfeeding Guide and Packet/Birth Certificate Instructions/Tdap Vaccination (VIS Pub Date: 2012)

## 2023-06-29 NOTE — OB PROVIDER DELIVERY SUMMARY - NSSELHIDDEN_OBGYN_ALL_OB_FT
[NS_DeliveryAttending1_OBGYN_ALL_OB_FT:[id number]==],[NS_DeliveryAssist1_OBGYN_ALL_OB_FT:[id number]=]

## 2023-06-29 NOTE — OB RN DELIVERY SUMMARY - NSSELHIDDEN_OBGYN_ALL_OB_FT
[NS_DeliveryAttending1_OBGYN_ALL_OB_FT:[id number]==],[NS_DeliveryAssist1_OBGYN_ALL_OB_FT:[id number]=],[NS_DeliveryRN_OBGYN_ALL_OB_FT:[id number]=]

## 2023-06-29 NOTE — DISCHARGE NOTE OB - CARE PROVIDER_API CALL
Glen Andrade  Obstetrics and Gynecology  [address], [address]  [place] [address]  Phone: [phone]  Fax: [phone]  Follow Up Time:

## 2023-06-29 NOTE — DISCHARGE NOTE OB - NS MD DC FALL RISK RISK
For information on Fall & Injury Prevention, visit: [email or web address] OR  [email or web address] OR  https://www.cdc.gov/steadi/patient.html

## 2023-06-29 NOTE — OB RN DELIVERY SUMMARY - NS_BREASTINHOURA_OBGYN_ALL_OB
Presents to ED via EMS. Was playing with brother and suddenly dropped to the ground and would not communicate with anyone else. On EMS arrival pt was found on ground and refused to answer questions or interact in any way. GCS 15. No prior hx of mental health or abuse issues. Safe in home. No PMH other than regular Peds follow ups. Offered, feeding was successful

## 2023-06-29 NOTE — OB PROVIDER H&P - NSLOWPPHRISK_OBGYN_A_OB
No previous uterine incision/Josue Pregnancy/Less than or equal to 4 previous vaginal births/No known bleeding disorder/No history of postpartum hemorrhage

## 2023-06-29 NOTE — OB PROVIDER LABOR PROGRESS NOTE - ASSESSMENT
A/P 38y  @38w1d IOL for PROM@420a, A1  -IOL: s/p BC x1, for Pitocin  -Cat 1 tracing  -GBS neg  -EFW 2900  -Analgesia - not requesting pain mgmt at this time  -Anticipate     d/w Dr. Nico Moreno, PGY-1

## 2023-06-29 NOTE — OB RN PATIENT PROFILE - NS_ACCOMPAINEDBY_OBGYN_ALL_OB
Goal Outcome Evaluation:    Plan of Care Reviewed With: patient, spouse     Overall Patient Progress: improving    VSS and postpartum assessments WNL. Pain adequately managed with tylenol and ibuprofen. Breastfeeding independently with good latch. Bonding well with  and responsive to cues. Expresses readiness for transition to home. Follow-up instructions reviewed, discharge education completed and questions answered, patient verbalizes understanding. Home medications given and instructions reviewed. Discharged to home with baby in car seat.     Spouse

## 2023-06-29 NOTE — DISCHARGE NOTE OB - MEDICATION SUMMARY - MEDICATIONS TO TAKE
I will START or STAY ON the medications listed below when I get home from the hospital:    ibuprofen 600 mg oral tablet  -- 1 tab(s) by mouth every 6 hours  -- Indication: For Pain   I will START or STAY ON the medications listed below when I get home from the hospital:    ibuprofen 600 mg oral tablet  -- 1 tab(s) by mouth every 6 hours as needed for  moderate pain  -- Indication: For Pain    acetaminophen 325 mg oral tablet  -- 3 tab(s) by mouth every 8 hours as needed for  moderate pain  -- Indication: For Vaginal delivery   I will START or STAY ON the medications listed below when I get home from the hospital:    ibuprofen 600 mg oral tablet  -- 1 tab(s) by mouth every 6 hours as needed for  moderate pain  -- Indication: For Pain    acetaminophen 325 mg oral tablet  -- 3 tab(s) by mouth every 8 hours as needed for  moderate pain  -- Indication: For Pain    Prenatal Multivitamins with Folic Acid 1 mg oral tablet  -- 1 tab(s) by mouth once a day  -- Indication: For Vitamins

## 2023-06-29 NOTE — DISCHARGE NOTE OB - HOSPITAL COURSE
P1 @ 38 weeks admitted with PROM. Delivered viable male infant via . Postpartum course uncomplicated.

## 2023-06-30 RX ORDER — IBUPROFEN 200 MG
600 TABLET ORAL EVERY 6 HOURS
Refills: 0 | Status: DISCONTINUED | OUTPATIENT
Start: 2023-06-30 | End: 2023-07-01

## 2023-06-30 RX ORDER — ACETAMINOPHEN 500 MG
3 TABLET ORAL
Qty: 0 | Refills: 0 | DISCHARGE
Start: 2023-06-30

## 2023-06-30 RX ADMIN — Medication 975 MILLIGRAM(S): at 21:41

## 2023-06-30 RX ADMIN — Medication 30 MILLIGRAM(S): at 01:39

## 2023-06-30 RX ADMIN — Medication 600 MILLIGRAM(S): at 23:55

## 2023-06-30 RX ADMIN — SODIUM CHLORIDE 3 MILLILITER(S): 9 INJECTION INTRAMUSCULAR; INTRAVENOUS; SUBCUTANEOUS at 22:00

## 2023-06-30 RX ADMIN — Medication 600 MILLIGRAM(S): at 11:19

## 2023-06-30 RX ADMIN — Medication 30 MILLIGRAM(S): at 02:10

## 2023-06-30 RX ADMIN — Medication 975 MILLIGRAM(S): at 06:30

## 2023-06-30 RX ADMIN — Medication 975 MILLIGRAM(S): at 06:02

## 2023-06-30 RX ADMIN — Medication 600 MILLIGRAM(S): at 17:29

## 2023-06-30 RX ADMIN — Medication 975 MILLIGRAM(S): at 20:51

## 2023-06-30 RX ADMIN — Medication 600 MILLIGRAM(S): at 12:00

## 2023-06-30 RX ADMIN — SODIUM CHLORIDE 3 MILLILITER(S): 9 INJECTION INTRAMUSCULAR; INTRAVENOUS; SUBCUTANEOUS at 05:07

## 2023-06-30 RX ADMIN — SODIUM CHLORIDE 3 MILLILITER(S): 9 INJECTION INTRAMUSCULAR; INTRAVENOUS; SUBCUTANEOUS at 15:32

## 2023-06-30 NOTE — LACTATION INITIAL EVALUATION - LACTATION INTERVENTIONS
Primary RN made aware of consult and plan./initiate/review safe skin-to-skin/initiate/review hand expression/initiate/review techniques for position and latch/post discharge community resources provided/initiate/review nipple shield use/review techniques to increase milk supply/review techniques to manage sore nipples/engorgement/initiate/review breast massage/compression/reviewed components of an effective feeding and at least 8 effective feedings per day required/reviewed importance of monitoring infant diapers, the breastfeeding log, and minimum output each day/reviewed risks of artificial nipples/reviewed benefits and recommendations for rooming in/reviewed feeding on demand/by cue at least 8 times a day

## 2023-06-30 NOTE — LACTATION INITIAL EVALUATION - POTENTIAL FOR
ineffective breastfeeding/sore breast/s/sore nipples/engorgement/knowledge deficit/plugged ducts/latch on difficulty/low supply

## 2023-06-30 NOTE — PROGRESS NOTE ADULT - SUBJECTIVE AND OBJECTIVE BOX
S: Patient doing well. Minimal lochia. Pain controlled. breastfeeding    O: Vital Signs Last 24 Hrs  T(C): 37 (2023 14:32), Max: 37 (2023 14:32)  T(F): 98.6 (2023 14:32), Max: 98.6 (2023 14:32)  HR: 96 (2023 14:32) (81 - 107)  BP: 111/55 (2023 14:32) (97/62 - 142/89)  BP(mean): --  RR: 17 (2023 14:32) (17 - 18)  SpO2: 100% (2023 14:32) (99% - 100%)    Parameters below as of 2023 14:32  Patient On (Oxygen Delivery Method): room air        Gen: NAD  Abd: soft, NT, ND, fundus firm below umbilicus  Lochia: moderate  Ext: no tenderness    Labs:                        11.9   11.39 )-----------( 234      ( 2023 06:50 )             35.3       A: 38y PPD#1 s/p  doing well.     Plan: Continue routine postpartum care. Anticipate d/c home in am.

## 2023-06-30 NOTE — LACTATION INITIAL EVALUATION - HYPOTHYROID
RX PROGRESS NOTE: Warfarin Anticoagulation Monitoring Initiation Note    Warfarin prescribed for the indication of Atrial fibrillation/atrial flutter.    Target INR is 2.0 - 3.0.    Anticipated duration of therapy is Indefinite.    Patient continued on prior warfarin therapy.  Dose prior to pharmacist inpatient anticoagulation management service consultation was warfarin 4 mg SuTuThSa and 2 mg MWF.    Most Recent Lab Values:  INR (no units)   Date/Time Value   08/26/2021 1345 1.7     HGB (g/dL)   Date/Time Value   08/26/2021 0957 11.7 (L)     HCT (%)   Date/Time Value   08/26/2021 0957 36.8     PLT (K/mcL)   Date/Time Value   08/26/2021 0957 225     GOT/AST (Units/L)   Date/Time Value   08/26/2021 1536 51 (H)     GPT/ALT (Units/L)   Date/Time Value   08/26/2021 1536 51     Serum creatinine: 0.6 mg/dL 08/26/21 1536  Estimated creatinine clearance: 60.3 mL/min  OB/Gyn status: Hysterectomy      Medical History:  Weight: Weight: 60.2 kg (08/26/21 1509)  Age: 82 year old   Patient does not have factors that may warrant deviation from the standard protocol (protocol exception).     The patient's medication and allergy profile was reviewed for possible drug-allergy, drug-drug, and drug-herbal interactions.    Assessment & Plan:  For anticoagulation therapy, will continue current warfarin dose of 4 mg once.     Pharmacist inpatient anticoagulation management will review/monitor patient daily and order warfarin dose/regimen based on protocol.    Thank you,    Fiona Ndiaye RPH  8/26/2021 5:16 PM  
no

## 2023-07-01 VITALS
DIASTOLIC BLOOD PRESSURE: 70 MMHG | TEMPERATURE: 98 F | SYSTOLIC BLOOD PRESSURE: 108 MMHG | HEART RATE: 95 BPM | RESPIRATION RATE: 18 BRPM | OXYGEN SATURATION: 100 %

## 2023-07-01 RX ADMIN — Medication 600 MILLIGRAM(S): at 12:12

## 2023-07-01 RX ADMIN — Medication 600 MILLIGRAM(S): at 06:00

## 2023-07-01 RX ADMIN — Medication 600 MILLIGRAM(S): at 13:00

## 2023-07-01 RX ADMIN — Medication 975 MILLIGRAM(S): at 03:30

## 2023-07-01 RX ADMIN — Medication 975 MILLIGRAM(S): at 02:46

## 2023-07-01 RX ADMIN — Medication 975 MILLIGRAM(S): at 10:00

## 2023-07-01 RX ADMIN — Medication 975 MILLIGRAM(S): at 09:25

## 2023-07-01 RX ADMIN — Medication 600 MILLIGRAM(S): at 00:45

## 2023-07-01 RX ADMIN — Medication 1 TABLET(S): at 09:25

## 2023-07-01 RX ADMIN — Medication 600 MILLIGRAM(S): at 05:14

## 2023-07-03 ENCOUNTER — APPOINTMENT (OUTPATIENT)
Dept: OBGYN | Facility: CLINIC | Age: 38
End: 2023-07-03

## 2023-07-03 LAB
HCV RNA SPEC NAA+PROBE-LOG IU: SIGNIFICANT CHANGE UP
HCV RNA SPEC NAA+PROBE-LOG IU: SIGNIFICANT CHANGE UP LOGIU/ML

## 2023-07-04 RX ORDER — CALCIUM CARBONATE 500(1250)
1 TABLET ORAL
Refills: 0 | DISCHARGE

## 2023-08-08 ENCOUNTER — APPOINTMENT (OUTPATIENT)
Dept: OBGYN | Facility: CLINIC | Age: 38
End: 2023-08-08
Payer: OTHER GOVERNMENT

## 2023-08-08 VITALS
WEIGHT: 146 LBS | DIASTOLIC BLOOD PRESSURE: 86 MMHG | HEIGHT: 61 IN | BODY MASS INDEX: 27.56 KG/M2 | HEART RATE: 80 BPM | SYSTOLIC BLOOD PRESSURE: 131 MMHG

## 2023-08-08 DIAGNOSIS — O24.410 GESTATIONAL DIABETES MELLITUS IN PREGNANCY, DIET CONTROLLED: ICD-10-CM

## 2023-08-08 PROBLEM — Z78.9 OTHER SPECIFIED HEALTH STATUS: Chronic | Status: ACTIVE | Noted: 2023-06-29

## 2023-08-08 PROCEDURE — 0503F POSTPARTUM CARE VISIT: CPT

## 2023-08-09 NOTE — HISTORY OF PRESENT ILLNESS
[Postpartum Follow Up] : postpartum follow up [Complications:___] : complications include: [unfilled] [Gestational Diabetes] : gestational diabetes [Delivery Date: ___] : on [unfilled] [Male] : Delivery History: baby boy [Vaginal Delivery] : vaginal delivery [Diabetes in Pregnancy] : diabetes in pregnancy [Delivery Date Was ___] : delivery date was [unfilled] [Boy] : baby is a boy [Infant's Name ___] : [unfilled] [___ Lbs] : [unfilled] lbs [___ Oz] : [unfilled] oz [Circumcised] : circumcised [Living at Home] : is currently living at home [Breastfeeding] : currently nursing [Intended Contraception] : Intended Contraception: [IUD] : intrauterine device [Rhogam] : Rhogam was not administered [Rubella Vaccine] : Rubella vaccine was not administered [Pertussis Vaccine] : Pertussis vaccine was not administered [BTL] : no tubal ligation [BF with Difficulty] : nursing without difficulty [Resumed Menses] : has not resumed her menses [Resumed [place]] : has not resumed intercourse [Abdominal Pain] : no abdominal pain [Breast Pain] : no breast pain [BreastFeeding Problems] : no breastfeeding problems [S/Sx PP Depression] : no signs/symptoms of postpartum depression [Heavy Bleeding] : no heavy bleeding [Irregular Bleeding] : no irregular bleeding [Shortness of Breath] : no shortness of breath [Suicidal Ideation] : no suicidal ideation [Back to Normal] : is back to normal in size [None] : no vaginal bleeding [Normal] : the vagina was normal [Not Done] : Examination of breasts not done [de-identified] : s/p  doing well, has not resumed period or intercourse, desires Paragard IUD placement. Will RTO for placement

## 2023-08-14 ENCOUNTER — APPOINTMENT (OUTPATIENT)
Dept: ORTHOPEDIC SURGERY | Facility: CLINIC | Age: 38
End: 2023-08-14
Payer: COMMERCIAL

## 2023-08-14 DIAGNOSIS — M53.3 SACROCOCCYGEAL DISORDERS, NOT ELSEWHERE CLASSIFIED: ICD-10-CM

## 2023-08-14 DIAGNOSIS — S32.810A MULTIPLE FRACTURES OF PELVIS WITH STABLE DISRUPTION OF PELVIC RING, INITIAL ENCOUNTER FOR CLOSED FRACTURE: ICD-10-CM

## 2023-08-14 PROCEDURE — 72190 X-RAY EXAM OF PELVIS: CPT

## 2023-08-14 PROCEDURE — 99214 OFFICE O/P EST MOD 30 MIN: CPT

## 2023-08-14 NOTE — PHYSICAL EXAM
[de-identified] : Right hip Painless range of motion, no pain with deep flexion internal rotation.  Mild pain with BRANDY Mildly tender palpation around SI joint and PSIS [de-identified] : 8/14/23: 3 views pelvis–right more than left sacroiliitis
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
## 2023-08-22 ENCOUNTER — APPOINTMENT (OUTPATIENT)
Dept: MATERNAL FETAL MEDICINE | Facility: CLINIC | Age: 38
End: 2023-08-22
Payer: OTHER GOVERNMENT

## 2023-08-22 PROCEDURE — G0109 DIAB MANAGE TRN IND/GROUP: CPT | Mod: 95

## 2023-09-19 ENCOUNTER — APPOINTMENT (OUTPATIENT)
Dept: OBGYN | Facility: CLINIC | Age: 38
End: 2023-09-19
Payer: OTHER GOVERNMENT

## 2023-09-19 ENCOUNTER — ASOB RESULT (OUTPATIENT)
Age: 38
End: 2023-09-19

## 2023-09-19 VITALS
SYSTOLIC BLOOD PRESSURE: 128 MMHG | WEIGHT: 138.44 LBS | BODY MASS INDEX: 26.14 KG/M2 | DIASTOLIC BLOOD PRESSURE: 82 MMHG | HEIGHT: 61 IN | HEART RATE: 73 BPM

## 2023-09-19 DIAGNOSIS — Z30.430 ENCOUNTER FOR INSERTION OF INTRAUTERINE CONTRACEPTIVE DEVICE: ICD-10-CM

## 2023-09-19 PROCEDURE — 76830 TRANSVAGINAL US NON-OB: CPT

## 2023-09-19 PROCEDURE — 58300 INSERT INTRAUTERINE DEVICE: CPT

## 2023-09-20 PROBLEM — Z30.430 ENCOUNTER FOR IUD INSERTION: Status: ACTIVE | Noted: 2019-05-23

## 2023-09-22 LAB
HCG UR QL: NEGATIVE
QUALITY CONTROL: YES

## 2023-10-05 NOTE — OB RN PATIENT PROFILE - BMI (KG/M2)
Patient has CPE 10/20/23. Refills can be addressed at upcoming appointment.     Needs fasting labs. RN spoke with patient who states he would prefer to have lab work done day of appointment. Lab orders already placed.    29.5

## 2024-01-29 NOTE — OB PROVIDER IHI INDUCTION/AUGMENTATION NOTE - LABOR: FETAL STATION
[de-identified] : 52 year old female for initial evaluation of thyroid nodules  Noticed 1/19 - went to bed friday night, woke up the next morning with a left sided neck  mass that prompted her to go to [facility]. Having mild pain to the area, especially worse when she lays flat. Having difficulty swallowing both solids and liquids. Painful swallowing and choking. Lost about 15lbs within 2 weeks due to inability to eat. She is experiencing SOB with both rest and activity. No changes to voice, but it is painful to speak.  No Smoking history No ETOH use  CT neck 1/20/24 IMPRESSION: Large left thyroid cystic mass up to 5.6 cm in diameter and with possible internal hemorrhage given recent onset of pain. Larynx and trachea shifted midline   US thyroid and parathyroid 1/20/24 IMPRESSION: 5.5 cm complex cyst of the left thyroid lobe likely to represent recent hemorrhage within pre existing thyroid nodule   Labs 1/20/24 TSH  0.41, t4 9.3  -2

## 2024-02-21 NOTE — ED ADULT NURSE NOTE - NSICDXPASTMEDICALHX_GEN_ALL_CORE_FT
LM for patient to return call to do a postop f/u.   PAST MEDICAL HISTORY:  Termination of pregnancy (fetus)

## 2025-03-24 NOTE — ED ADULT NURSE NOTE - NS PRO AD PATIENT TYPE ON CHART
Last office visit (LOV) for chronic condition 3/11/2025  Next office visit (NOV) 7/10/2025  UDS 1/23/2024  CSA 7/25/2024    
Health Care Proxy (HCP)